# Patient Record
Sex: FEMALE | Race: WHITE | Employment: FULL TIME | URBAN - METROPOLITAN AREA
[De-identification: names, ages, dates, MRNs, and addresses within clinical notes are randomized per-mention and may not be internally consistent; named-entity substitution may affect disease eponyms.]

---

## 2017-01-03 ENCOUNTER — TELEPHONE (OUTPATIENT)
Dept: FAMILY MEDICINE CLINIC | Age: 47
End: 2017-01-03

## 2017-01-17 ENCOUNTER — OFFICE VISIT (OUTPATIENT)
Dept: FAMILY MEDICINE CLINIC | Age: 47
End: 2017-01-17

## 2017-01-17 VITALS
BODY MASS INDEX: 24.64 KG/M2 | TEMPERATURE: 97.9 F | DIASTOLIC BLOOD PRESSURE: 80 MMHG | HEIGHT: 67 IN | HEART RATE: 72 BPM | RESPIRATION RATE: 18 BRPM | WEIGHT: 157 LBS | SYSTOLIC BLOOD PRESSURE: 130 MMHG

## 2017-01-17 DIAGNOSIS — M79.7 FIBROMYALGIA: Primary | ICD-10-CM

## 2017-01-17 DIAGNOSIS — G89.4 CHRONIC PAIN SYNDROME: ICD-10-CM

## 2017-01-17 DIAGNOSIS — M25.50 ARTHRALGIA, UNSPECIFIED JOINT: ICD-10-CM

## 2017-01-17 DIAGNOSIS — R53.83 FATIGUE, UNSPECIFIED TYPE: ICD-10-CM

## 2017-01-17 DIAGNOSIS — M79.10 MYALGIA: ICD-10-CM

## 2017-01-17 DIAGNOSIS — R60.1 GENERALIZED EDEMA: ICD-10-CM

## 2017-01-17 PROCEDURE — 99214 OFFICE O/P EST MOD 30 MIN: CPT | Performed by: FAMILY MEDICINE

## 2017-01-17 RX ORDER — SULFASALAZINE 500 MG/1
500 TABLET ORAL 2 TIMES DAILY
COMMUNITY
End: 2017-04-10

## 2017-01-17 ASSESSMENT — PATIENT HEALTH QUESTIONNAIRE - PHQ9
2. FEELING DOWN, DEPRESSED OR HOPELESS: 0
1. LITTLE INTEREST OR PLEASURE IN DOING THINGS: 0
SUM OF ALL RESPONSES TO PHQ QUESTIONS 1-9: 0
SUM OF ALL RESPONSES TO PHQ9 QUESTIONS 1 & 2: 0

## 2017-01-20 ENCOUNTER — TELEPHONE (OUTPATIENT)
Dept: FAMILY MEDICINE CLINIC | Age: 47
End: 2017-01-20

## 2017-01-23 PROBLEM — M47.812 CERVICAL SPONDYLOSIS WITHOUT MYELOPATHY: Status: ACTIVE | Noted: 2017-01-23

## 2017-01-23 PROBLEM — M47.817 SPONDYLOSIS OF LUMBOSACRAL REGION WITHOUT MYELOPATHY OR RADICULOPATHY: Status: ACTIVE | Noted: 2017-01-23

## 2017-01-23 PROBLEM — G89.4 CHRONIC PAIN SYNDROME: Status: ACTIVE | Noted: 2017-01-23

## 2017-03-28 ENCOUNTER — OFFICE VISIT (OUTPATIENT)
Dept: FAMILY MEDICINE CLINIC | Age: 47
End: 2017-03-28

## 2017-03-28 VITALS
BODY MASS INDEX: 25.36 KG/M2 | SYSTOLIC BLOOD PRESSURE: 110 MMHG | DIASTOLIC BLOOD PRESSURE: 74 MMHG | RESPIRATION RATE: 16 BRPM | HEART RATE: 68 BPM | TEMPERATURE: 97.4 F | WEIGHT: 161.9 LBS

## 2017-03-28 DIAGNOSIS — E55.9 VITAMIN D DEFICIENCY: ICD-10-CM

## 2017-03-28 DIAGNOSIS — G89.4 CHRONIC PAIN SYNDROME: ICD-10-CM

## 2017-03-28 DIAGNOSIS — Z72.0 TOBACCO ABUSE: ICD-10-CM

## 2017-03-28 DIAGNOSIS — F41.8 MIXED ANXIETY DEPRESSIVE DISORDER: Primary | ICD-10-CM

## 2017-03-28 DIAGNOSIS — J45.20 MILD INTERMITTENT ASTHMA WITHOUT COMPLICATION: ICD-10-CM

## 2017-03-28 DIAGNOSIS — R60.1 GENERALIZED EDEMA: ICD-10-CM

## 2017-03-28 DIAGNOSIS — M47.812 CERVICAL SPONDYLOSIS WITHOUT MYELOPATHY: ICD-10-CM

## 2017-03-28 DIAGNOSIS — M79.7 FIBROMYALGIA: ICD-10-CM

## 2017-03-28 DIAGNOSIS — H90.5 HEARING LOSS, SENSORY, UNILATERAL: ICD-10-CM

## 2017-03-28 DIAGNOSIS — R53.83 FATIGUE, UNSPECIFIED TYPE: ICD-10-CM

## 2017-03-28 DIAGNOSIS — F41.8 MIXED ANXIETY DEPRESSIVE DISORDER: ICD-10-CM

## 2017-03-28 PROBLEM — J45.909 ASTHMA: Status: ACTIVE | Noted: 2017-03-28

## 2017-03-28 PROBLEM — J45.909 ASTHMA: Status: RESOLVED | Noted: 2017-03-28 | Resolved: 2017-03-28

## 2017-03-28 LAB
ALBUMIN SERPL-MCNC: 4.4 G/DL (ref 3.9–4.9)
ALP BLD-CCNC: 73 U/L (ref 40–130)
ALT SERPL-CCNC: 80 U/L (ref 0–33)
ANION GAP SERPL CALCULATED.3IONS-SCNC: 12 MEQ/L (ref 7–13)
AST SERPL-CCNC: 44 U/L (ref 0–35)
BILIRUB SERPL-MCNC: 0.4 MG/DL (ref 0–1.2)
BUN BLDV-MCNC: 8 MG/DL (ref 6–20)
CALCIUM SERPL-MCNC: 9.1 MG/DL (ref 8.6–10.2)
CHLORIDE BLD-SCNC: 100 MEQ/L (ref 98–107)
CHOLESTEROL, TOTAL: 201 MG/DL (ref 0–199)
CO2: 27 MEQ/L (ref 22–29)
CREAT SERPL-MCNC: 0.8 MG/DL (ref 0.5–0.9)
GFR AFRICAN AMERICAN: >60
GFR NON-AFRICAN AMERICAN: >60
GLOBULIN: 2.3 G/DL (ref 2.3–3.5)
GLUCOSE BLD-MCNC: 77 MG/DL (ref 74–109)
HDLC SERPL-MCNC: 49 MG/DL (ref 40–59)
LDL CHOLESTEROL CALCULATED: 129 MG/DL (ref 0–129)
POTASSIUM SERPL-SCNC: 4.8 MEQ/L (ref 3.5–5.1)
SODIUM BLD-SCNC: 139 MEQ/L (ref 132–144)
TOTAL PROTEIN: 6.7 G/DL (ref 6.4–8.1)
TRIGL SERPL-MCNC: 117 MG/DL (ref 0–200)
TSH SERPL DL<=0.05 MIU/L-ACNC: 2.46 UIU/ML (ref 0.27–4.2)
VITAMIN D 25-HYDROXY: 38.4 NG/ML (ref 30–100)

## 2017-03-28 PROCEDURE — 99214 OFFICE O/P EST MOD 30 MIN: CPT | Performed by: FAMILY MEDICINE

## 2017-03-28 RX ORDER — MIRTAZAPINE 15 MG/1
15 TABLET, FILM COATED ORAL NIGHTLY
Qty: 30 TABLET | Refills: 5 | Status: SHIPPED | OUTPATIENT
Start: 2017-03-28 | End: 2017-06-13 | Stop reason: SDUPTHER

## 2017-04-10 ENCOUNTER — OFFICE VISIT (OUTPATIENT)
Dept: FAMILY MEDICINE CLINIC | Age: 47
End: 2017-04-10

## 2017-04-10 VITALS
SYSTOLIC BLOOD PRESSURE: 120 MMHG | WEIGHT: 162.4 LBS | BODY MASS INDEX: 25.49 KG/M2 | HEART RATE: 106 BPM | HEIGHT: 67 IN | RESPIRATION RATE: 18 BRPM | TEMPERATURE: 98.3 F | DIASTOLIC BLOOD PRESSURE: 76 MMHG

## 2017-04-10 DIAGNOSIS — M79.7 FIBROMYALGIA: ICD-10-CM

## 2017-04-10 DIAGNOSIS — R53.83 FATIGUE, UNSPECIFIED TYPE: ICD-10-CM

## 2017-04-10 DIAGNOSIS — J45.20 MILD INTERMITTENT ASTHMA WITHOUT COMPLICATION: ICD-10-CM

## 2017-04-10 DIAGNOSIS — M79.10 MYALGIA: Primary | ICD-10-CM

## 2017-04-10 DIAGNOSIS — R60.1 GENERALIZED EDEMA: ICD-10-CM

## 2017-04-10 DIAGNOSIS — M25.50 ARTHRALGIA, UNSPECIFIED JOINT: ICD-10-CM

## 2017-04-10 DIAGNOSIS — Z72.0 TOBACCO ABUSE: ICD-10-CM

## 2017-04-10 DIAGNOSIS — R74.8 ELEVATED LIVER ENZYMES: ICD-10-CM

## 2017-04-10 PROCEDURE — 99214 OFFICE O/P EST MOD 30 MIN: CPT | Performed by: FAMILY MEDICINE

## 2017-04-10 RX ORDER — HYDROXYCHLOROQUINE SULFATE 200 MG/1
200 TABLET, FILM COATED ORAL DAILY
COMMUNITY
End: 2017-08-03

## 2017-04-13 ENCOUNTER — TELEPHONE (OUTPATIENT)
Dept: FAMILY MEDICINE CLINIC | Age: 47
End: 2017-04-13

## 2017-04-13 DIAGNOSIS — R51.9 CHRONIC NONINTRACTABLE HEADACHE, UNSPECIFIED HEADACHE TYPE: Primary | ICD-10-CM

## 2017-04-13 DIAGNOSIS — G89.29 CHRONIC NONINTRACTABLE HEADACHE, UNSPECIFIED HEADACHE TYPE: Primary | ICD-10-CM

## 2017-05-12 DIAGNOSIS — R74.8 ELEVATED LIVER ENZYMES: ICD-10-CM

## 2017-05-12 LAB
ALBUMIN SERPL-MCNC: 4 G/DL (ref 3.9–4.9)
ALP BLD-CCNC: 96 U/L (ref 40–130)
ALT SERPL-CCNC: 30 U/L (ref 0–33)
ANION GAP SERPL CALCULATED.3IONS-SCNC: 10 MEQ/L (ref 7–13)
AST SERPL-CCNC: 18 U/L (ref 0–35)
BILIRUB SERPL-MCNC: 0.2 MG/DL (ref 0–1.2)
BUN BLDV-MCNC: 9 MG/DL (ref 6–20)
CALCIUM SERPL-MCNC: 8.9 MG/DL (ref 8.6–10.2)
CHLORIDE BLD-SCNC: 100 MEQ/L (ref 98–107)
CO2: 29 MEQ/L (ref 22–29)
CREAT SERPL-MCNC: 0.78 MG/DL (ref 0.5–0.9)
GFR AFRICAN AMERICAN: >60
GFR NON-AFRICAN AMERICAN: >60
GLOBULIN: 2.6 G/DL (ref 2.3–3.5)
GLUCOSE BLD-MCNC: 106 MG/DL (ref 74–109)
POTASSIUM SERPL-SCNC: 4.8 MEQ/L (ref 3.5–5.1)
SODIUM BLD-SCNC: 139 MEQ/L (ref 132–144)
TOTAL PROTEIN: 6.6 G/DL (ref 6.4–8.1)

## 2017-06-13 ENCOUNTER — OFFICE VISIT (OUTPATIENT)
Dept: FAMILY MEDICINE CLINIC | Age: 47
End: 2017-06-13

## 2017-06-13 VITALS
TEMPERATURE: 99 F | HEART RATE: 74 BPM | RESPIRATION RATE: 16 BRPM | HEIGHT: 67 IN | BODY MASS INDEX: 25.55 KG/M2 | SYSTOLIC BLOOD PRESSURE: 118 MMHG | DIASTOLIC BLOOD PRESSURE: 76 MMHG | WEIGHT: 162.8 LBS

## 2017-06-13 DIAGNOSIS — B36.0 TINEA VERSICOLOR: ICD-10-CM

## 2017-06-13 DIAGNOSIS — F41.8 MIXED ANXIETY DEPRESSIVE DISORDER: Primary | ICD-10-CM

## 2017-06-13 DIAGNOSIS — R60.1 GENERALIZED EDEMA: ICD-10-CM

## 2017-06-13 DIAGNOSIS — E55.9 VITAMIN D DEFICIENCY: ICD-10-CM

## 2017-06-13 DIAGNOSIS — G89.4 CHRONIC PAIN SYNDROME: ICD-10-CM

## 2017-06-13 DIAGNOSIS — Z72.0 TOBACCO ABUSE: ICD-10-CM

## 2017-06-13 DIAGNOSIS — M79.7 FIBROMYALGIA: ICD-10-CM

## 2017-06-13 DIAGNOSIS — M47.817 SPONDYLOSIS OF LUMBOSACRAL REGION WITHOUT MYELOPATHY OR RADICULOPATHY: ICD-10-CM

## 2017-06-13 DIAGNOSIS — R53.83 FATIGUE, UNSPECIFIED TYPE: ICD-10-CM

## 2017-06-13 PROCEDURE — 99214 OFFICE O/P EST MOD 30 MIN: CPT | Performed by: FAMILY MEDICINE

## 2017-06-13 RX ORDER — KETOCONAZOLE 20 MG/ML
SHAMPOO TOPICAL
Qty: 120 ML | Refills: 3 | Status: SHIPPED | OUTPATIENT
Start: 2017-06-13 | End: 2018-03-01 | Stop reason: ALTCHOICE

## 2017-06-13 RX ORDER — PREDNISOLONE ACETATE 10 MG/ML
1 SUSPENSION/ DROPS OPHTHALMIC 4 TIMES DAILY
Refills: 2 | COMMUNITY
Start: 2017-05-11 | End: 2017-08-03

## 2017-06-13 RX ORDER — MIRTAZAPINE 30 MG/1
30 TABLET, FILM COATED ORAL NIGHTLY
Qty: 30 TABLET | Refills: 3 | Status: SHIPPED | OUTPATIENT
Start: 2017-06-13 | End: 2017-09-13 | Stop reason: SDUPTHER

## 2017-06-14 ENCOUNTER — TELEPHONE (OUTPATIENT)
Dept: FAMILY MEDICINE CLINIC | Age: 47
End: 2017-06-14

## 2017-06-26 ENCOUNTER — OFFICE VISIT (OUTPATIENT)
Dept: BEHAVIORAL/MENTAL HEALTH | Age: 47
End: 2017-06-26

## 2017-06-26 DIAGNOSIS — F43.23 ADJUSTMENT DISORDER WITH MIXED ANXIETY AND DEPRESSED MOOD: Primary | ICD-10-CM

## 2017-06-26 PROCEDURE — 90791 PSYCH DIAGNOSTIC EVALUATION: CPT | Performed by: PSYCHOLOGIST

## 2017-06-26 ASSESSMENT — PATIENT HEALTH QUESTIONNAIRE - PHQ9
4. FEELING TIRED OR HAVING LITTLE ENERGY: 3
8. MOVING OR SPEAKING SO SLOWLY THAT OTHER PEOPLE COULD HAVE NOTICED. OR THE OPPOSITE, BEING SO FIGETY OR RESTLESS THAT YOU HAVE BEEN MOVING AROUND A LOT MORE THAN USUAL: 3
6. FEELING BAD ABOUT YOURSELF - OR THAT YOU ARE A FAILURE OR HAVE LET YOURSELF OR YOUR FAMILY DOWN: 3
SUM OF ALL RESPONSES TO PHQ9 QUESTIONS 1 & 2: 6
7. TROUBLE CONCENTRATING ON THINGS, SUCH AS READING THE NEWSPAPER OR WATCHING TELEVISION: 3
10. IF YOU CHECKED OFF ANY PROBLEMS, HOW DIFFICULT HAVE THESE PROBLEMS MADE IT FOR YOU TO DO YOUR WORK, TAKE CARE OF THINGS AT HOME, OR GET ALONG WITH OTHER PEOPLE: 3
SUM OF ALL RESPONSES TO PHQ QUESTIONS 1-9: 22
5. POOR APPETITE OR OVEREATING: 3
2. FEELING DOWN, DEPRESSED OR HOPELESS: 3
9. THOUGHTS THAT YOU WOULD BE BETTER OFF DEAD, OR OF HURTING YOURSELF: 0
3. TROUBLE FALLING OR STAYING ASLEEP: 1
1. LITTLE INTEREST OR PLEASURE IN DOING THINGS: 3

## 2017-07-06 DIAGNOSIS — M54.2 NECK PAIN: Primary | ICD-10-CM

## 2017-07-10 ENCOUNTER — OFFICE VISIT (OUTPATIENT)
Dept: BEHAVIORAL/MENTAL HEALTH | Age: 47
End: 2017-07-10

## 2017-07-10 DIAGNOSIS — F43.23 ADJUSTMENT DISORDER WITH MIXED ANXIETY AND DEPRESSED MOOD: Primary | ICD-10-CM

## 2017-07-10 PROCEDURE — 90832 PSYTX W PT 30 MINUTES: CPT | Performed by: PSYCHOLOGIST

## 2017-07-10 ASSESSMENT — PATIENT HEALTH QUESTIONNAIRE - PHQ9
10. IF YOU CHECKED OFF ANY PROBLEMS, HOW DIFFICULT HAVE THESE PROBLEMS MADE IT FOR YOU TO DO YOUR WORK, TAKE CARE OF THINGS AT HOME, OR GET ALONG WITH OTHER PEOPLE: 3
7. TROUBLE CONCENTRATING ON THINGS, SUCH AS READING THE NEWSPAPER OR WATCHING TELEVISION: 2
SUM OF ALL RESPONSES TO PHQ9 QUESTIONS 1 & 2: 5
6. FEELING BAD ABOUT YOURSELF - OR THAT YOU ARE A FAILURE OR HAVE LET YOURSELF OR YOUR FAMILY DOWN: 3
4. FEELING TIRED OR HAVING LITTLE ENERGY: 2
2. FEELING DOWN, DEPRESSED OR HOPELESS: 3
5. POOR APPETITE OR OVEREATING: 3
1. LITTLE INTEREST OR PLEASURE IN DOING THINGS: 2
SUM OF ALL RESPONSES TO PHQ QUESTIONS 1-9: 19
3. TROUBLE FALLING OR STAYING ASLEEP: 2
8. MOVING OR SPEAKING SO SLOWLY THAT OTHER PEOPLE COULD HAVE NOTICED. OR THE OPPOSITE, BEING SO FIGETY OR RESTLESS THAT YOU HAVE BEEN MOVING AROUND A LOT MORE THAN USUAL: 2
9. THOUGHTS THAT YOU WOULD BE BETTER OFF DEAD, OR OF HURTING YOURSELF: 0

## 2017-07-24 ENCOUNTER — OFFICE VISIT (OUTPATIENT)
Dept: BEHAVIORAL/MENTAL HEALTH | Age: 47
End: 2017-07-24

## 2017-07-24 DIAGNOSIS — F43.23 ADJUSTMENT DISORDER WITH MIXED ANXIETY AND DEPRESSED MOOD: Primary | ICD-10-CM

## 2017-07-24 PROCEDURE — 90832 PSYTX W PT 30 MINUTES: CPT | Performed by: PSYCHOLOGIST

## 2017-07-24 ASSESSMENT — PATIENT HEALTH QUESTIONNAIRE - PHQ9
8. MOVING OR SPEAKING SO SLOWLY THAT OTHER PEOPLE COULD HAVE NOTICED. OR THE OPPOSITE, BEING SO FIGETY OR RESTLESS THAT YOU HAVE BEEN MOVING AROUND A LOT MORE THAN USUAL: 2
6. FEELING BAD ABOUT YOURSELF - OR THAT YOU ARE A FAILURE OR HAVE LET YOURSELF OR YOUR FAMILY DOWN: 0
5. POOR APPETITE OR OVEREATING: 3
9. THOUGHTS THAT YOU WOULD BE BETTER OFF DEAD, OR OF HURTING YOURSELF: 0
1. LITTLE INTEREST OR PLEASURE IN DOING THINGS: 2
10. IF YOU CHECKED OFF ANY PROBLEMS, HOW DIFFICULT HAVE THESE PROBLEMS MADE IT FOR YOU TO DO YOUR WORK, TAKE CARE OF THINGS AT HOME, OR GET ALONG WITH OTHER PEOPLE: 2
2. FEELING DOWN, DEPRESSED OR HOPELESS: 1
7. TROUBLE CONCENTRATING ON THINGS, SUCH AS READING THE NEWSPAPER OR WATCHING TELEVISION: 3
3. TROUBLE FALLING OR STAYING ASLEEP: 1
SUM OF ALL RESPONSES TO PHQ QUESTIONS 1-9: 15
SUM OF ALL RESPONSES TO PHQ9 QUESTIONS 1 & 2: 3
4. FEELING TIRED OR HAVING LITTLE ENERGY: 3

## 2017-08-03 ENCOUNTER — OFFICE VISIT (OUTPATIENT)
Dept: FAMILY MEDICINE CLINIC | Age: 47
End: 2017-08-03

## 2017-08-03 VITALS
BODY MASS INDEX: 25.43 KG/M2 | RESPIRATION RATE: 16 BRPM | TEMPERATURE: 97.6 F | HEIGHT: 67 IN | WEIGHT: 162 LBS | SYSTOLIC BLOOD PRESSURE: 118 MMHG | DIASTOLIC BLOOD PRESSURE: 74 MMHG | HEART RATE: 72 BPM

## 2017-08-03 DIAGNOSIS — M47.817 SPONDYLOSIS OF LUMBOSACRAL REGION WITHOUT MYELOPATHY OR RADICULOPATHY: ICD-10-CM

## 2017-08-03 DIAGNOSIS — M79.7 FIBROMYALGIA: Primary | ICD-10-CM

## 2017-08-03 DIAGNOSIS — M25.561 ARTHRALGIA OF RIGHT KNEE: ICD-10-CM

## 2017-08-03 DIAGNOSIS — L71.9 ROSACEA: ICD-10-CM

## 2017-08-03 DIAGNOSIS — M79.10 MYALGIA: ICD-10-CM

## 2017-08-03 DIAGNOSIS — R53.83 FATIGUE, UNSPECIFIED TYPE: ICD-10-CM

## 2017-08-03 DIAGNOSIS — M47.812 CERVICAL SPONDYLOSIS WITHOUT MYELOPATHY: ICD-10-CM

## 2017-08-03 PROCEDURE — 99214 OFFICE O/P EST MOD 30 MIN: CPT | Performed by: FAMILY MEDICINE

## 2017-08-03 RX ORDER — GABAPENTIN 600 MG/1
600 TABLET ORAL 3 TIMES DAILY
Qty: 180 TABLET | Refills: 3 | Status: SHIPPED | OUTPATIENT
Start: 2017-08-03 | End: 2017-11-17 | Stop reason: SDUPTHER

## 2017-08-03 RX ORDER — DEXTROAMPHETAMINE SACCHARATE, AMPHETAMINE ASPARTATE MONOHYDRATE, DEXTROAMPHETAMINE SULFATE AND AMPHETAMINE SULFATE 5; 5; 5; 5 MG/1; MG/1; MG/1; MG/1
1 CAPSULE, EXTENDED RELEASE ORAL DAILY
COMMUNITY
Start: 2017-07-27

## 2017-08-07 ENCOUNTER — OFFICE VISIT (OUTPATIENT)
Dept: BEHAVIORAL/MENTAL HEALTH | Age: 47
End: 2017-08-07

## 2017-08-07 DIAGNOSIS — F43.23 ADJUSTMENT DISORDER WITH MIXED ANXIETY AND DEPRESSED MOOD: Primary | ICD-10-CM

## 2017-08-07 PROCEDURE — 90832 PSYTX W PT 30 MINUTES: CPT | Performed by: PSYCHOLOGIST

## 2017-08-07 ASSESSMENT — PATIENT HEALTH QUESTIONNAIRE - PHQ9
9. THOUGHTS THAT YOU WOULD BE BETTER OFF DEAD, OR OF HURTING YOURSELF: 0
10. IF YOU CHECKED OFF ANY PROBLEMS, HOW DIFFICULT HAVE THESE PROBLEMS MADE IT FOR YOU TO DO YOUR WORK, TAKE CARE OF THINGS AT HOME, OR GET ALONG WITH OTHER PEOPLE: 0
8. MOVING OR SPEAKING SO SLOWLY THAT OTHER PEOPLE COULD HAVE NOTICED. OR THE OPPOSITE, BEING SO FIGETY OR RESTLESS THAT YOU HAVE BEEN MOVING AROUND A LOT MORE THAN USUAL: 0
3. TROUBLE FALLING OR STAYING ASLEEP: 0
6. FEELING BAD ABOUT YOURSELF - OR THAT YOU ARE A FAILURE OR HAVE LET YOURSELF OR YOUR FAMILY DOWN: 0
4. FEELING TIRED OR HAVING LITTLE ENERGY: 2
2. FEELING DOWN, DEPRESSED OR HOPELESS: 0
7. TROUBLE CONCENTRATING ON THINGS, SUCH AS READING THE NEWSPAPER OR WATCHING TELEVISION: 2
5. POOR APPETITE OR OVEREATING: 3
SUM OF ALL RESPONSES TO PHQ QUESTIONS 1-9: 8
SUM OF ALL RESPONSES TO PHQ9 QUESTIONS 1 & 2: 1
1. LITTLE INTEREST OR PLEASURE IN DOING THINGS: 1

## 2017-08-21 ENCOUNTER — OFFICE VISIT (OUTPATIENT)
Dept: BEHAVIORAL/MENTAL HEALTH | Age: 47
End: 2017-08-21

## 2017-08-21 DIAGNOSIS — F43.23 ADJUSTMENT DISORDER WITH MIXED ANXIETY AND DEPRESSED MOOD: Primary | ICD-10-CM

## 2017-08-21 PROCEDURE — 90832 PSYTX W PT 30 MINUTES: CPT | Performed by: PSYCHOLOGIST

## 2017-08-21 ASSESSMENT — PATIENT HEALTH QUESTIONNAIRE - PHQ9
SUM OF ALL RESPONSES TO PHQ9 QUESTIONS 1 & 2: 0
7. TROUBLE CONCENTRATING ON THINGS, SUCH AS READING THE NEWSPAPER OR WATCHING TELEVISION: 1
9. THOUGHTS THAT YOU WOULD BE BETTER OFF DEAD, OR OF HURTING YOURSELF: 0
1. LITTLE INTEREST OR PLEASURE IN DOING THINGS: 0
3. TROUBLE FALLING OR STAYING ASLEEP: 0
2. FEELING DOWN, DEPRESSED OR HOPELESS: 0
8. MOVING OR SPEAKING SO SLOWLY THAT OTHER PEOPLE COULD HAVE NOTICED. OR THE OPPOSITE, BEING SO FIGETY OR RESTLESS THAT YOU HAVE BEEN MOVING AROUND A LOT MORE THAN USUAL: 0
SUM OF ALL RESPONSES TO PHQ QUESTIONS 1-9: 4
6. FEELING BAD ABOUT YOURSELF - OR THAT YOU ARE A FAILURE OR HAVE LET YOURSELF OR YOUR FAMILY DOWN: 0
5. POOR APPETITE OR OVEREATING: 0
10. IF YOU CHECKED OFF ANY PROBLEMS, HOW DIFFICULT HAVE THESE PROBLEMS MADE IT FOR YOU TO DO YOUR WORK, TAKE CARE OF THINGS AT HOME, OR GET ALONG WITH OTHER PEOPLE: 1
4. FEELING TIRED OR HAVING LITTLE ENERGY: 3

## 2017-09-05 ENCOUNTER — OFFICE VISIT (OUTPATIENT)
Dept: BEHAVIORAL/MENTAL HEALTH | Age: 47
End: 2017-09-05

## 2017-09-05 DIAGNOSIS — F43.23 ADJUSTMENT DISORDER WITH MIXED ANXIETY AND DEPRESSED MOOD: Primary | ICD-10-CM

## 2017-09-05 PROCEDURE — 90832 PSYTX W PT 30 MINUTES: CPT | Performed by: PSYCHOLOGIST

## 2017-09-05 ASSESSMENT — PATIENT HEALTH QUESTIONNAIRE - PHQ9
10. IF YOU CHECKED OFF ANY PROBLEMS, HOW DIFFICULT HAVE THESE PROBLEMS MADE IT FOR YOU TO DO YOUR WORK, TAKE CARE OF THINGS AT HOME, OR GET ALONG WITH OTHER PEOPLE: 1
6. FEELING BAD ABOUT YOURSELF - OR THAT YOU ARE A FAILURE OR HAVE LET YOURSELF OR YOUR FAMILY DOWN: 0
5. POOR APPETITE OR OVEREATING: 0
8. MOVING OR SPEAKING SO SLOWLY THAT OTHER PEOPLE COULD HAVE NOTICED. OR THE OPPOSITE, BEING SO FIGETY OR RESTLESS THAT YOU HAVE BEEN MOVING AROUND A LOT MORE THAN USUAL: 0
3. TROUBLE FALLING OR STAYING ASLEEP: 2
SUM OF ALL RESPONSES TO PHQ9 QUESTIONS 1 & 2: 1
7. TROUBLE CONCENTRATING ON THINGS, SUCH AS READING THE NEWSPAPER OR WATCHING TELEVISION: 1
4. FEELING TIRED OR HAVING LITTLE ENERGY: 3
9. THOUGHTS THAT YOU WOULD BE BETTER OFF DEAD, OR OF HURTING YOURSELF: 0
SUM OF ALL RESPONSES TO PHQ QUESTIONS 1-9: 7
2. FEELING DOWN, DEPRESSED OR HOPELESS: 0
1. LITTLE INTEREST OR PLEASURE IN DOING THINGS: 1

## 2017-09-08 ENCOUNTER — TELEPHONE (OUTPATIENT)
Dept: FAMILY MEDICINE CLINIC | Age: 47
End: 2017-09-08

## 2017-09-11 ENCOUNTER — OFFICE VISIT (OUTPATIENT)
Dept: FAMILY MEDICINE CLINIC | Age: 47
End: 2017-09-11

## 2017-09-11 VITALS
HEIGHT: 67 IN | WEIGHT: 162.4 LBS | SYSTOLIC BLOOD PRESSURE: 136 MMHG | DIASTOLIC BLOOD PRESSURE: 92 MMHG | TEMPERATURE: 98.4 F | BODY MASS INDEX: 25.49 KG/M2 | HEART RATE: 124 BPM | RESPIRATION RATE: 20 BRPM

## 2017-09-11 DIAGNOSIS — R74.8 ELEVATED LIVER ENZYMES: ICD-10-CM

## 2017-09-11 DIAGNOSIS — M79.7 FIBROMYALGIA: ICD-10-CM

## 2017-09-11 DIAGNOSIS — R60.1 GENERALIZED EDEMA: ICD-10-CM

## 2017-09-11 DIAGNOSIS — J45.20 MILD INTERMITTENT ASTHMA WITHOUT COMPLICATION: ICD-10-CM

## 2017-09-11 DIAGNOSIS — F41.8 MIXED ANXIETY DEPRESSIVE DISORDER: ICD-10-CM

## 2017-09-11 DIAGNOSIS — G89.4 CHRONIC PAIN SYNDROME: ICD-10-CM

## 2017-09-11 DIAGNOSIS — E55.9 VITAMIN D DEFICIENCY: ICD-10-CM

## 2017-09-11 DIAGNOSIS — I10 ESSENTIAL HYPERTENSION: Primary | ICD-10-CM

## 2017-09-11 PROCEDURE — 99214 OFFICE O/P EST MOD 30 MIN: CPT | Performed by: FAMILY MEDICINE

## 2017-09-11 RX ORDER — CARVEDILOL 3.12 MG/1
3.12 TABLET ORAL 2 TIMES DAILY
Qty: 60 TABLET | Refills: 3 | Status: SHIPPED | OUTPATIENT
Start: 2017-09-11 | End: 2017-12-11 | Stop reason: SDUPTHER

## 2017-09-14 RX ORDER — MIRTAZAPINE 30 MG/1
TABLET, FILM COATED ORAL
Qty: 30 TABLET | Refills: 5 | Status: SHIPPED | OUTPATIENT
Start: 2017-09-14 | End: 2018-02-27 | Stop reason: SDUPTHER

## 2017-09-18 ENCOUNTER — OFFICE VISIT (OUTPATIENT)
Dept: BEHAVIORAL/MENTAL HEALTH | Age: 47
End: 2017-09-18

## 2017-09-18 DIAGNOSIS — F43.23 ADJUSTMENT DISORDER WITH MIXED ANXIETY AND DEPRESSED MOOD: Primary | ICD-10-CM

## 2017-09-18 PROCEDURE — 90832 PSYTX W PT 30 MINUTES: CPT | Performed by: PSYCHOLOGIST

## 2017-09-18 ASSESSMENT — PATIENT HEALTH QUESTIONNAIRE - PHQ9
SUM OF ALL RESPONSES TO PHQ9 QUESTIONS 1 & 2: 2
5. POOR APPETITE OR OVEREATING: 0
SUM OF ALL RESPONSES TO PHQ QUESTIONS 1-9: 5
4. FEELING TIRED OR HAVING LITTLE ENERGY: 1
9. THOUGHTS THAT YOU WOULD BE BETTER OFF DEAD, OR OF HURTING YOURSELF: 0
2. FEELING DOWN, DEPRESSED OR HOPELESS: 1
7. TROUBLE CONCENTRATING ON THINGS, SUCH AS READING THE NEWSPAPER OR WATCHING TELEVISION: 1
6. FEELING BAD ABOUT YOURSELF - OR THAT YOU ARE A FAILURE OR HAVE LET YOURSELF OR YOUR FAMILY DOWN: 1
3. TROUBLE FALLING OR STAYING ASLEEP: 0
10. IF YOU CHECKED OFF ANY PROBLEMS, HOW DIFFICULT HAVE THESE PROBLEMS MADE IT FOR YOU TO DO YOUR WORK, TAKE CARE OF THINGS AT HOME, OR GET ALONG WITH OTHER PEOPLE: 1
8. MOVING OR SPEAKING SO SLOWLY THAT OTHER PEOPLE COULD HAVE NOTICED. OR THE OPPOSITE, BEING SO FIGETY OR RESTLESS THAT YOU HAVE BEEN MOVING AROUND A LOT MORE THAN USUAL: 0
1. LITTLE INTEREST OR PLEASURE IN DOING THINGS: 1

## 2017-10-09 ENCOUNTER — OFFICE VISIT (OUTPATIENT)
Dept: BEHAVIORAL/MENTAL HEALTH | Age: 47
End: 2017-10-09

## 2017-10-09 DIAGNOSIS — F43.23 ADJUSTMENT DISORDER WITH MIXED ANXIETY AND DEPRESSED MOOD: Primary | ICD-10-CM

## 2017-10-09 PROCEDURE — 90832 PSYTX W PT 30 MINUTES: CPT | Performed by: PSYCHOLOGIST

## 2017-10-09 ASSESSMENT — PATIENT HEALTH QUESTIONNAIRE - PHQ9
1. LITTLE INTEREST OR PLEASURE IN DOING THINGS: 1
SUM OF ALL RESPONSES TO PHQ QUESTIONS 1-9: 7
2. FEELING DOWN, DEPRESSED OR HOPELESS: 0
4. FEELING TIRED OR HAVING LITTLE ENERGY: 2
8. MOVING OR SPEAKING SO SLOWLY THAT OTHER PEOPLE COULD HAVE NOTICED. OR THE OPPOSITE, BEING SO FIGETY OR RESTLESS THAT YOU HAVE BEEN MOVING AROUND A LOT MORE THAN USUAL: 1
SUM OF ALL RESPONSES TO PHQ9 QUESTIONS 1 & 2: 1
5. POOR APPETITE OR OVEREATING: 2
9. THOUGHTS THAT YOU WOULD BE BETTER OFF DEAD, OR OF HURTING YOURSELF: 0
6. FEELING BAD ABOUT YOURSELF - OR THAT YOU ARE A FAILURE OR HAVE LET YOURSELF OR YOUR FAMILY DOWN: 0
7. TROUBLE CONCENTRATING ON THINGS, SUCH AS READING THE NEWSPAPER OR WATCHING TELEVISION: 1
3. TROUBLE FALLING OR STAYING ASLEEP: 0
10. IF YOU CHECKED OFF ANY PROBLEMS, HOW DIFFICULT HAVE THESE PROBLEMS MADE IT FOR YOU TO DO YOUR WORK, TAKE CARE OF THINGS AT HOME, OR GET ALONG WITH OTHER PEOPLE: 1

## 2017-10-09 NOTE — PROGRESS NOTES
Behavioral Health Consultation  Dafne Copeland, Ph.D., [facility]  Psychologist  10/9/17  9:56 AM      Time spent with Patient: 30 minutes  This is patient's eighth  [facility] appointment. Reason for Consult:  depression, anxiety, stress and fatigue  Referring Provider: Tanja De Oliveira MD  [address] [facility] [facility], [address]      Feedback given to PCP. S:  Pt preorts limited progress toards goals describing herself as  \"Tired and annoyed\". Noted she had about 9 hrs sleep lastnight, woke up once but went right back to sleep. Notices she can be easily aggravated when tired- describing a low frustration tolerance. Tries to use humor, reaching out to social supports. Discussed the Two friends that she is very close to and \"I talk to them like every day\". Pt notes that one lives distant, and although the other is closer she has limited availability. Oldest daughter and grandson moved out and into new apartment. \"It's nice\". Processed the impact of this move on patient's functioning, living environment and responsibilities. Pt had a job interview in the same department but a different role and hopes to have an opportunity for a trial run. 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 If less physical, pt would have an opportunity to increase her hours at work. Pt has already had one day of training and hopes that in one week or two she may have something more specific. Anticipating her nephew's wedding this Friday. Noted her daughter can't come. Pt shared that her daughter is bisexual and this creates distress for her relationship with Pt's mom. Recently attended a Reiki seminar and enjoyed the techniques explored  In that practice. Pt denies SI/HI.            O:  MSE:    Appearance    alert, cooperative  Appetite abnormal  Sleep disturbance Yes  Fatigue Yes  Loss of pleasure No  Impulsive behavior No  Speech    spontaneous, normal rate and History     Social History    Marital status: Single     Spouse name: N/A    Number of children: N/A    Years of education: N/A     Occupational History    Not on file. Social History Main Topics    Smoking status: Current Every Day Smoker     Types: Cigarettes    Smokeless tobacco: Never Used    Alcohol use 1.2 oz/week     2 Glasses of wine per week    Drug use: No    Sexual activity: Yes     Partners: Male     Birth control/ protection: Surgical     Other Topics Concern    Not on file     Social History Narrative    No narrative on file       TOBACCO:   reports that she has been smoking Cigarettes. She has never used smokeless tobacco.  ETOH:   reports that she drinks about 1.2 oz of alcohol per week . Family History:   Family History   Problem Relation Age of Onset    Arthritis Mother     Depression Mother     High Blood Pressure Mother     High Cholesterol Mother     Depression Father     Depression Sister     Depression Brother     Learning Disabilities Brother          A:  Administered the PHQ9 which indicates a self report of mild symptom distress. Pt would benefit from continued [facility] services to increase coping skills to provide symptom management/control/relief.        PHQ Scores 10/9/2017 9/18/2017 9/5/2017 8/21/2017 8/7/2017 7/24/2017 7/10/2017   PHQ2 Score 1 2 1 0 1 3 5   PHQ9 Score 7 5 7 4 8 15 19     Interpretation of Total Score Depression Severity: 1-4 = Minimal depression, 5-9 = Mild depression, 10-14 = Moderate depression, 15-19 = Moderately severe depression, 20-27 = Severe depression      Diagnosis:    Adjustment disorder with mixed anxiety and depressed mood      Diagnosis Date    ADHD (attention deficit hyperactivity disorder)     Anxiety     Asthma     Chronic back pain     Depression     Fibromyalgia     Headache     Osteoarthritis        Plan:  Pt interventions:  Conducted functional assessment, [place]-setting to identify pt's primary goals for [facility] visit /

## 2017-10-09 NOTE — PATIENT INSTRUCTIONS
1. Remember the benefit of identifying the supports in your life, Types of support they offer and then tapping into the right type at that moment. 2. Continue to use humor for coping! It's a wonderful strategy. 3. Revisit that toolbox and tap into it as needed.   4. Return in 3-4 weeks

## 2017-10-13 ENCOUNTER — TELEPHONE (OUTPATIENT)
Dept: FAMILY MEDICINE CLINIC | Age: 47
End: 2017-10-13

## 2017-10-13 DIAGNOSIS — Z46.1 HEARING AID FITTING OR ADJUSTMENT: Primary | ICD-10-CM

## 2017-10-17 ENCOUNTER — OFFICE VISIT (OUTPATIENT)
Dept: FAMILY MEDICINE CLINIC | Age: 47
End: 2017-10-17

## 2017-10-17 VITALS
SYSTOLIC BLOOD PRESSURE: 130 MMHG | HEART RATE: 110 BPM | OXYGEN SATURATION: 97 % | TEMPERATURE: 97.9 F | RESPIRATION RATE: 16 BRPM | BODY MASS INDEX: 25.74 KG/M2 | DIASTOLIC BLOOD PRESSURE: 86 MMHG | HEIGHT: 67 IN | WEIGHT: 164 LBS

## 2017-10-17 DIAGNOSIS — G89.4 CHRONIC PAIN SYNDROME: Primary | ICD-10-CM

## 2017-10-17 DIAGNOSIS — Z23 NEED FOR VACCINATION: ICD-10-CM

## 2017-10-17 DIAGNOSIS — R60.1 GENERALIZED EDEMA: ICD-10-CM

## 2017-10-17 DIAGNOSIS — R53.83 FATIGUE, UNSPECIFIED TYPE: ICD-10-CM

## 2017-10-17 DIAGNOSIS — M25.561 ARTHRALGIA OF RIGHT KNEE: ICD-10-CM

## 2017-10-17 DIAGNOSIS — M79.10 MYALGIA: ICD-10-CM

## 2017-10-17 DIAGNOSIS — M79.7 FIBROMYALGIA: ICD-10-CM

## 2017-10-17 DIAGNOSIS — M47.817 SPONDYLOSIS OF LUMBOSACRAL REGION WITHOUT MYELOPATHY OR RADICULOPATHY: ICD-10-CM

## 2017-10-17 DIAGNOSIS — M47.812 CERVICAL SPONDYLOSIS WITHOUT MYELOPATHY: ICD-10-CM

## 2017-10-17 PROCEDURE — G8427 DOCREV CUR MEDS BY ELIG CLIN: HCPCS | Performed by: FAMILY MEDICINE

## 2017-10-17 PROCEDURE — 99214 OFFICE O/P EST MOD 30 MIN: CPT | Performed by: FAMILY MEDICINE

## 2017-10-17 PROCEDURE — 4004F PT TOBACCO SCREEN RCVD TLK: CPT | Performed by: FAMILY MEDICINE

## 2017-10-17 PROCEDURE — G8417 CALC BMI ABV UP PARAM F/U: HCPCS | Performed by: FAMILY MEDICINE

## 2017-10-17 PROCEDURE — G8484 FLU IMMUNIZE NO ADMIN: HCPCS | Performed by: FAMILY MEDICINE

## 2017-10-17 PROCEDURE — 90688 IIV4 VACCINE SPLT 0.5 ML IM: CPT | Performed by: FAMILY MEDICINE

## 2017-10-17 PROCEDURE — 90471 IMMUNIZATION ADMIN: CPT | Performed by: FAMILY MEDICINE

## 2017-10-17 RX ORDER — BUSPIRONE HYDROCHLORIDE 7.5 MG/1
7.5 TABLET ORAL 2 TIMES DAILY
Qty: 60 TABLET | Refills: 3 | Status: SHIPPED | OUTPATIENT
Start: 2017-10-17 | End: 2018-03-01 | Stop reason: SDUPTHER

## 2017-10-17 NOTE — PROGRESS NOTES
SHOULDER SURGERY Right 2004    TUBAL LIGATION         [unfilled]    family history includes Arthritis in her mother; Depression in her brother, father, mother, and sister; High Blood Pressure in her mother; High Cholesterol in her mother; Learning Disabilities in her brother. Social History     Social History    Marital status: Single     Spouse name: N/A    Number of children: N/A    Years of education: N/A     Occupational History    Not on file. Social History Main Topics    Smoking status: Current Every Day Smoker     Types: Cigarettes    Smokeless tobacco: Never Used    Alcohol use 1.2 oz/week     2 Glasses of wine per week    Drug use: No    Sexual activity: Yes     Partners: Male     Birth control/ protection: Surgical     Other Topics Concern    Not on file     Social History Narrative    No narrative on file       Allergies   Allergen Reactions    Aleve [Naproxen] Other (See Comments)     Took to much     Naproxen Sodium Other (See Comments)     Insomnia    Onion Swelling     Throat swelling     Penicillins Hives    Prednisone Other (See Comments)     Vision problems per patient        Review of Systems - General ROS: negative  Psychological ROS: negative  ENT ROS: negative  Hematological and Lymphatic ROS: negative  Endocrine ROS: negative  Respiratory ROS: no cough, shortness of breath, or wheezing  Cardiovascular ROS: no chest pain or dyspnea on exertion  Gastrointestinal ROS: no abdominal pain, change in bowel habits, or black or bloody stools  Genito-Urinary ROS: no dysuria, trouble voiding, or hematuria  Musculoskeletal ROS: pain  Neurological ROS: no TIA or stroke symptoms  Dermatological ROS: Negative    Blood pressure 130/86, pulse 110, temperature 97.9 °F (36.6 °C), temperature source Temporal, resp. rate 16, height 5' 7\" (1.702 m), weight 164 lb (74.4 kg), SpO2 97 %, not currently breastfeeding.     Physical Examination: General appearance - alert, well appearing, and in no mouth four times a week      gabapentin (NEURONTIN) 600 MG tablet Take 1 tablet by mouth 3 times daily 180 tablet 3    etodolac (LODINE) 400 MG tablet Take 1 tablet by mouth 2 times daily 60 tablet 3    PROAIR  (90 BASE) MCG/ACT inhaler Inhale 2 puffs into the lungs every 6 hours as needed for Wheezing 3 Inhaler 3    brimonidine-timolol (COMBIGAN) 0.2-0.5 % ophthalmic solution Use in left eye      [DISCONTINUED] gabapentin (NEURONTIN) 300 MG capsule TAKE 1 CAP IN AM, 1 CAP AT 2 PM AND 2 CAPS AT NIGHT 120 capsule 2    magnesium oxide (MAG-OX) 400 (240 Mg) MG tablet Take 1 tablet by mouth daily 30 tablet 0    ketoconazole (NIZORAL) 2 % shampoo Apply topically daily as needed. 120 mL 3    [DISCONTINUED] milnacipran HCl (SAVELLA) 100 MG TABS Take 1 tablet by mouth 2 times daily 60 tablet 3    SUMAtriptan (IMITREX) 50 MG tablet Take 1 tablet by mouth once as needed for Migraine 9 tablet 3    [DISCONTINUED] busPIRone (BUSPAR) 7.5 MG tablet Take 1 tablet by mouth 2 times daily 60 tablet 3     No facility-administered encounter medications on file as of 10/17/2017. Report any suicidal/homocidal ideation, any psychotic or manic symptoms. Recommend lifestyle modification. Monitor liver blood work at next visit  Keep specialist evaluation  Return in about 3 months (around 1/17/2018). Patient education provided. They understand and agree with this course of treatment. They will return with new or worsening symptoms. Patient instructed to remain current with appropriate annual health maintenance.

## 2017-10-31 ENCOUNTER — OFFICE VISIT (OUTPATIENT)
Dept: FAMILY MEDICINE CLINIC | Age: 47
End: 2017-10-31

## 2017-10-31 VITALS
TEMPERATURE: 97.9 F | DIASTOLIC BLOOD PRESSURE: 84 MMHG | HEIGHT: 67 IN | RESPIRATION RATE: 14 BRPM | BODY MASS INDEX: 25.58 KG/M2 | WEIGHT: 163 LBS | HEART RATE: 125 BPM | OXYGEN SATURATION: 96 % | SYSTOLIC BLOOD PRESSURE: 130 MMHG

## 2017-10-31 DIAGNOSIS — R53.83 FATIGUE, UNSPECIFIED TYPE: Primary | ICD-10-CM

## 2017-10-31 DIAGNOSIS — M47.817 SPONDYLOSIS OF LUMBOSACRAL REGION WITHOUT MYELOPATHY OR RADICULOPATHY: ICD-10-CM

## 2017-10-31 DIAGNOSIS — M79.7 FIBROMYALGIA: ICD-10-CM

## 2017-10-31 DIAGNOSIS — G89.4 CHRONIC PAIN SYNDROME: ICD-10-CM

## 2017-10-31 DIAGNOSIS — M47.812 CERVICAL SPONDYLOSIS WITHOUT MYELOPATHY: ICD-10-CM

## 2017-10-31 DIAGNOSIS — M79.10 MYALGIA: ICD-10-CM

## 2017-10-31 DIAGNOSIS — R60.1 GENERALIZED EDEMA: ICD-10-CM

## 2017-10-31 DIAGNOSIS — M25.561 ARTHRALGIA OF RIGHT KNEE: ICD-10-CM

## 2017-10-31 DIAGNOSIS — R53.83 FATIGUE, UNSPECIFIED TYPE: ICD-10-CM

## 2017-10-31 LAB
ALBUMIN SERPL-MCNC: 4.6 G/DL (ref 3.9–4.9)
ALP BLD-CCNC: 78 U/L (ref 40–130)
ALT SERPL-CCNC: 36 U/L (ref 0–33)
ANION GAP SERPL CALCULATED.3IONS-SCNC: 18 MEQ/L (ref 7–13)
AST SERPL-CCNC: 24 U/L (ref 0–35)
BASOPHILS ABSOLUTE: 0.1 K/UL (ref 0–0.2)
BASOPHILS RELATIVE PERCENT: 1.1 %
BILIRUB SERPL-MCNC: 0.3 MG/DL (ref 0–1.2)
BUN BLDV-MCNC: 10 MG/DL (ref 6–20)
CALCIUM SERPL-MCNC: 9.4 MG/DL (ref 8.6–10.2)
CHLORIDE BLD-SCNC: 98 MEQ/L (ref 98–107)
CO2: 25 MEQ/L (ref 22–29)
CREAT SERPL-MCNC: 0.83 MG/DL (ref 0.5–0.9)
EOSINOPHILS ABSOLUTE: 0.6 K/UL (ref 0–0.7)
EOSINOPHILS RELATIVE PERCENT: 6.6 %
FOLATE: 13.8 NG/ML (ref 7.3–26.1)
GFR AFRICAN AMERICAN: >60
GFR NON-AFRICAN AMERICAN: >60
GLOBULIN: 2.8 G/DL (ref 2.3–3.5)
GLUCOSE BLD-MCNC: 123 MG/DL (ref 74–109)
HCT VFR BLD CALC: 46.5 % (ref 37–47)
HEMOGLOBIN: 15.4 G/DL (ref 12–16)
LYMPHOCYTES ABSOLUTE: 3 K/UL (ref 1–4.8)
LYMPHOCYTES RELATIVE PERCENT: 32.7 %
MCH RBC QN AUTO: 31.7 PG (ref 27–31.3)
MCHC RBC AUTO-ENTMCNC: 33.1 % (ref 33–37)
MCV RBC AUTO: 95.6 FL (ref 82–100)
MONOCYTES ABSOLUTE: 0.6 K/UL (ref 0.2–0.8)
MONOCYTES RELATIVE PERCENT: 7 %
NEUTROPHILS ABSOLUTE: 4.9 K/UL (ref 1.4–6.5)
NEUTROPHILS RELATIVE PERCENT: 52.6 %
PDW BLD-RTO: 15.9 % (ref 11.5–14.5)
PLATELET # BLD: 422 K/UL (ref 130–400)
POTASSIUM SERPL-SCNC: 5.2 MEQ/L (ref 3.5–5.1)
RBC # BLD: 4.86 M/UL (ref 4.2–5.4)
SODIUM BLD-SCNC: 141 MEQ/L (ref 132–144)
TOTAL PROTEIN: 7.4 G/DL (ref 6.4–8.1)
TSH REFLEX: 2.28 UIU/ML (ref 0.27–4.2)
VITAMIN B-12: 729 PG/ML (ref 211–946)
VITAMIN D 25-HYDROXY: 51.5 NG/ML (ref 30–100)
WBC # BLD: 9.2 K/UL (ref 4.8–10.8)

## 2017-10-31 PROCEDURE — 4004F PT TOBACCO SCREEN RCVD TLK: CPT | Performed by: FAMILY MEDICINE

## 2017-10-31 PROCEDURE — 99214 OFFICE O/P EST MOD 30 MIN: CPT | Performed by: FAMILY MEDICINE

## 2017-10-31 PROCEDURE — G8484 FLU IMMUNIZE NO ADMIN: HCPCS | Performed by: FAMILY MEDICINE

## 2017-10-31 PROCEDURE — G8417 CALC BMI ABV UP PARAM F/U: HCPCS | Performed by: FAMILY MEDICINE

## 2017-10-31 PROCEDURE — G8427 DOCREV CUR MEDS BY ELIG CLIN: HCPCS | Performed by: FAMILY MEDICINE

## 2017-10-31 NOTE — PROGRESS NOTES
Chief Complaint   Patient presents with    Chest Pain    Fatigue    Swelling       Edema has been ongoing  She would like blood work  Recommend low sodium diet  Weight is stable  May be manifestation of fibromyalgia  She declines more testing    fatigue symptoms are worsening  She would like additional blood work  Discussed difficulty with work    Myalgia relatively stable  Fibro sx  Status post pain dr kirkland  saw rheum as well    Vit d deficiency stable  rec supplement  Recommend blood work    Mood no real change  No new issues reported  No suicidal/homocidal ideation. No psychotic or manic symptoms. Patient presents for exam.      Patient Active Problem List   Diagnosis    Myalgia    Arthralgia    Fatigue    Generalized edema    Fibromyalgia    Cervical spondylosis without myelopathy    Spondylosis of lumbosacral region without myelopathy or radiculopathy    Chronic pain syndrome    Contusion of foot    History of  section    History of laparoscopy    Mixed conductive and sensorineural hearing loss, unilateral with unrestricted hearing on the contralateral side    Hearing loss, sensory, unilateral    Delivery normal    Vitamin D deficiency    Mixed anxiety depressive disorder    Mild intermittent asthma without complication    Tobacco abuse    Elevated liver enzymes       has a current medication list which includes the following prescription(s): buspirone, milnacipran hcl, mirtazapine, carvedilol, amphetamine-dextroamphetamine, methotrexate, gabapentin, ketoconazole, etodolac, proair hfa, brimonidine-timolol, magnesium oxide, and sumatriptan. Past Medical History:   Diagnosis Date    ADHD (attention deficit hyperactivity disorder)     Anxiety     Asthma     Chronic back pain     Depression     Fibromyalgia     Headache     Osteoarthritis        Past Surgical History:   Procedure Laterality Date     SECTION      EYE SURGERY Right 2017    DR. Babita Mo KNEE ARTHROSCOPY Right 2006    SHOULDER SURGERY Right 2004    TUBAL LIGATION         [unfilled]    family history includes Arthritis in her mother; Depression in her brother, father, mother, and sister; High Blood Pressure in her mother; High Cholesterol in her mother; Learning Disabilities in her brother. Social History     Social History    Marital status: Single     Spouse name: N/A    Number of children: N/A    Years of education: N/A     Occupational History    Not on file. Social History Main Topics    Smoking status: Current Every Day Smoker     Types: Cigarettes    Smokeless tobacco: Never Used    Alcohol use 1.2 oz/week     2 Glasses of wine per week    Drug use: No    Sexual activity: Yes     Partners: Male     Birth control/ protection: Surgical     Other Topics Concern    Not on file     Social History Narrative    No narrative on file       Allergies   Allergen Reactions    Aleve [Naproxen] Other (See Comments)     Took to much     Naproxen Sodium Other (See Comments)     Insomnia    Onion Swelling     Throat swelling     Penicillins Hives    Prednisone Other (See Comments)     Vision problems per patient        Review of Systems - General ROS: negative  Psychological ROS: negative  ENT ROS: negative  Hematological and Lymphatic ROS: negative  Endocrine ROS: negative  Respiratory ROS: no cough, shortness of breath, or wheezing  Cardiovascular ROS: no chest pain or dyspnea on exertion  Gastrointestinal ROS: no abdominal pain, change in bowel habits, or black or bloody stools  Genito-Urinary ROS: no dysuria, trouble voiding, or hematuria  Musculoskeletal ROS: pain  Neurological ROS: no TIA or stroke symptoms  Dermatological ROS: Negative    Blood pressure 130/84, pulse 125, temperature 97.9 °F (36.6 °C), temperature source Temporal, resp. rate 14, height 5' 7\" (1.702 m), weight 163 lb (73.9 kg), SpO2 96 %, not currently breastfeeding.     Physical Examination: General appearance - Standing Status:   Future     Number of Occurrences:   1     Standing Expiration Date:   10/31/2018    Vitamin B12 & Folate     Standing Status:   Future     Number of Occurrences:   1     Standing Expiration Date:   10/31/2018       Outpatient Encounter Prescriptions as of 10/31/2017   Medication Sig Dispense Refill    busPIRone (BUSPAR) 7.5 MG tablet Take 1 tablet by mouth 2 times daily 60 tablet 3    milnacipran HCl (SAVELLA) 100 MG TABS Take 1 tablet by mouth 2 times daily 60 tablet 3    mirtazapine (REMERON) 30 MG tablet TAKE 1 TABLET BY MOUTH NIGHTLY 30 tablet 5    carvedilol (COREG) 3.125 MG tablet Take 1 tablet by mouth 2 times daily 60 tablet 3    amphetamine-dextroamphetamine (ADDERALL XR) 20 MG extended release capsule Take 1 capsule by mouth daily  .  methotrexate (RHEUMATREX) 2.5 MG chemo tablet Take 1 tablet by mouth four times a week      gabapentin (NEURONTIN) 600 MG tablet Take 1 tablet by mouth 3 times daily 180 tablet 3    ketoconazole (NIZORAL) 2 % shampoo Apply topically daily as needed. 120 mL 3    etodolac (LODINE) 400 MG tablet Take 1 tablet by mouth 2 times daily 60 tablet 3    PROAIR  (90 BASE) MCG/ACT inhaler Inhale 2 puffs into the lungs every 6 hours as needed for Wheezing 3 Inhaler 3    brimonidine-timolol (COMBIGAN) 0.2-0.5 % ophthalmic solution Use in left eye      magnesium oxide (MAG-OX) 400 (240 Mg) MG tablet Take 1 tablet by mouth daily 30 tablet 0    SUMAtriptan (IMITREX) 50 MG tablet Take 1 tablet by mouth once as needed for Migraine 9 tablet 3     No facility-administered encounter medications on file as of 10/31/2017. Report any suicidal/homocidal ideation, any psychotic or manic symptoms. Recommend lifestyle modification. Monitor liver blood work at next visit  Keep specialist evaluation  Return in about 3 months (around 1/31/2018). Patient education provided. They understand and agree with this course of treatment.  They will return with new

## 2017-10-31 NOTE — PATIENT INSTRUCTIONS
attend a smoking cessation program to help you quit smoking. When you choose a program, look for one that has proven success. Ask your doctor for ideas. You will greatly increase your chances of success if you take medicine as well as get counseling or join a cessation program.  Some of the changes you feel when you first quit tobacco are uncomfortable. Your body will miss the nicotine at first, and you may feel short-tempered and grumpy. You may have trouble sleeping or concentrating. Medicine can help you deal with these symptoms. You may struggle with changing your smoking habits and rituals. The last step is the tricky one: Be prepared for the smoking urge to continue for a time. This is a lot to deal with, but keep at it. You will feel better. Follow-up care is a key part of your treatment and safety. Be sure to make and go to all appointments, and call your doctor if you are having problems. Its also a good idea to know your test results and keep a list of the medicines you take. How can you care for yourself at home? · Ask your family, friends, and coworkers for support. You have a better chance of quitting if you have help and support. · Join a support group, such as Nicotine Anonymous, for people who are trying to quit smoking. · Consider signing up for a smoking cessation program, such as the American Lung Association's Freedom from Smoking program.  · Set a quit date. Pick your date carefully so that it is not right in the middle of a big deadline or stressful time. Once you quit, do not even take a puff. Get rid of all ashtrays and lighters after your last cigarette. Clean your house and your clothes so that they do not smell of smoke. · Learn how to be a nonsmoker. Think about ways you can avoid those things that make you reach for a cigarette. ¨ Avoid situations that put you at greatest risk for smoking. For some people, it is hard to have a drink with friends without smoking.  For others, they might skip a coffee break with coworkers who smoke. ¨ Change your daily routine. Take a different route to work or eat a meal in a different place. · Cut down on stress. Calm yourself or release tension by doing an activity you enjoy, such as reading a book, taking a hot bath, or gardening. · Talk to your doctor or pharmacist about nicotine replacement therapy, which replaces the nicotine in your body. You still get nicotine but you do not use tobacco. Nicotine replacement products help you slowly reduce the amount of nicotine you need. These products come in several forms, many of them available over-the-counter:  ¨ Nicotine patches  ¨ Nicotine gum and lozenges  ¨ Nicotine inhaler  · Ask your doctor about bupropion (Wellbutrin) or varenicline (Chantix), which are prescription medicines. They do not contain nicotine. They help you by reducing withdrawal symptoms, such as stress and anxiety. · Some people find hypnosis, acupuncture, and massage helpful for ending the smoking habit. · Eat a healthy diet and get regular exercise. Having healthy habits will help your body move past its craving for nicotine. · Be prepared to keep trying. Most people are not successful the first few times they try to quit. Do not get mad at yourself if you smoke again. Make a list of things you learned and think about when you want to try again, such as next week, next month, or next year. Where can you learn more? Go to https://[email or web address]. org and sign in to your [employer] account. Enter O131 in the [facility] box to learn more about \"Stopping Smoking: Care Instructions. \"     If you do not have an account, please click on the \"Sign Up Now\" link. Current as of: March 20, 2017  Content Version: 11.3  © 5444-5133 [employer]. Care instructions adapted under license by [facility] ([facility]).  If you have questions about a medical condition or this instruction, always ask your healthcare professional.

## 2017-11-06 ENCOUNTER — OFFICE VISIT (OUTPATIENT)
Dept: BEHAVIORAL/MENTAL HEALTH | Age: 47
End: 2017-11-06

## 2017-11-06 DIAGNOSIS — F43.23 ADJUSTMENT DISORDER WITH MIXED ANXIETY AND DEPRESSED MOOD: Primary | ICD-10-CM

## 2017-11-06 PROCEDURE — 90832 PSYTX W PT 30 MINUTES: CPT | Performed by: PSYCHOLOGIST

## 2017-11-06 ASSESSMENT — PATIENT HEALTH QUESTIONNAIRE - PHQ9
SUM OF ALL RESPONSES TO PHQ QUESTIONS 1-9: 0
9. THOUGHTS THAT YOU WOULD BE BETTER OFF DEAD, OR OF HURTING YOURSELF: 0
3. TROUBLE FALLING OR STAYING ASLEEP: 0
6. FEELING BAD ABOUT YOURSELF - OR THAT YOU ARE A FAILURE OR HAVE LET YOURSELF OR YOUR FAMILY DOWN: 0
10. IF YOU CHECKED OFF ANY PROBLEMS, HOW DIFFICULT HAVE THESE PROBLEMS MADE IT FOR YOU TO DO YOUR WORK, TAKE CARE OF THINGS AT HOME, OR GET ALONG WITH OTHER PEOPLE: 0
1. LITTLE INTEREST OR PLEASURE IN DOING THINGS: 0
4. FEELING TIRED OR HAVING LITTLE ENERGY: 0
2. FEELING DOWN, DEPRESSED OR HOPELESS: 0
7. TROUBLE CONCENTRATING ON THINGS, SUCH AS READING THE NEWSPAPER OR WATCHING TELEVISION: 0
5. POOR APPETITE OR OVEREATING: 0
8. MOVING OR SPEAKING SO SLOWLY THAT OTHER PEOPLE COULD HAVE NOTICED. OR THE OPPOSITE, BEING SO FIGETY OR RESTLESS THAT YOU HAVE BEEN MOVING AROUND A LOT MORE THAN USUAL: 0
SUM OF ALL RESPONSES TO PHQ9 QUESTIONS 1 & 2: 0

## 2017-11-06 NOTE — PROGRESS NOTES
daily 60 tablet 3    mirtazapine (REMERON) 30 MG tablet TAKE 1 TABLET BY MOUTH NIGHTLY 30 tablet 5    carvedilol (COREG) 3.125 MG tablet Take 1 tablet by mouth 2 times daily 60 tablet 3    amphetamine-dextroamphetamine (ADDERALL XR) 20 MG extended release capsule Take 1 capsule by mouth daily  .  methotrexate (RHEUMATREX) 2.5 MG chemo tablet Take 1 tablet by mouth four times a week      gabapentin (NEURONTIN) 600 MG tablet Take 1 tablet by mouth 3 times daily 180 tablet 3    magnesium oxide (MAG-OX) 400 (240 Mg) MG tablet Take 1 tablet by mouth daily 30 tablet 0    ketoconazole (NIZORAL) 2 % shampoo Apply topically daily as needed. 120 mL 3    etodolac (LODINE) 400 MG tablet Take 1 tablet by mouth 2 times daily 60 tablet 3    PROAIR  (90 BASE) MCG/ACT inhaler Inhale 2 puffs into the lungs every 6 hours as needed for Wheezing 3 Inhaler 3    SUMAtriptan (IMITREX) 50 MG tablet Take 1 tablet by mouth once as needed for Migraine 9 tablet 3    brimonidine-timolol (COMBIGAN) 0.2-0.5 % ophthalmic solution Use in left eye       No current facility-administered medications for this visit. Social History:   Social History     Social History    Marital status: Single     Spouse name: N/A    Number of children: N/A    Years of education: N/A     Occupational History    Not on file. Social History Main Topics    Smoking status: Current Every Day Smoker     Types: Cigarettes    Smokeless tobacco: Never Used    Alcohol use 1.2 oz/week     2 Glasses of wine per week    Drug use: No    Sexual activity: Yes     Partners: Male     Birth control/ protection: Surgical     Other Topics Concern    Not on file     Social History Narrative    No narrative on file       TOBACCO:   reports that she has been smoking Cigarettes. She has never used smokeless tobacco.  ETOH:   reports that she drinks about 1.2 oz of alcohol per week .     Family History:   Family History   Problem Relation Age of Onset

## 2017-11-06 NOTE — PATIENT INSTRUCTIONS
1. Consider healthy snacking into your routine. 2. Consider what you need to hear in moments of panic- develop and apply a personal mantra for thought stopping.    3. Return in about a month

## 2017-11-16 ENCOUNTER — OFFICE VISIT (OUTPATIENT)
Dept: FAMILY MEDICINE CLINIC | Age: 47
End: 2017-11-16

## 2017-11-16 VITALS
TEMPERATURE: 98.6 F | DIASTOLIC BLOOD PRESSURE: 80 MMHG | SYSTOLIC BLOOD PRESSURE: 130 MMHG | RESPIRATION RATE: 16 BRPM | WEIGHT: 164 LBS | HEIGHT: 67 IN | BODY MASS INDEX: 25.74 KG/M2 | HEART RATE: 126 BPM | OXYGEN SATURATION: 97 %

## 2017-11-16 DIAGNOSIS — G89.4 CHRONIC PAIN SYNDROME: ICD-10-CM

## 2017-11-16 DIAGNOSIS — M47.812 CERVICAL SPONDYLOSIS WITHOUT MYELOPATHY: ICD-10-CM

## 2017-11-16 DIAGNOSIS — R51.9 NONINTRACTABLE HEADACHE, UNSPECIFIED CHRONICITY PATTERN, UNSPECIFIED HEADACHE TYPE: Primary | ICD-10-CM

## 2017-11-16 DIAGNOSIS — M79.7 FIBROMYALGIA: ICD-10-CM

## 2017-11-16 DIAGNOSIS — M47.817 SPONDYLOSIS OF LUMBOSACRAL REGION WITHOUT MYELOPATHY OR RADICULOPATHY: ICD-10-CM

## 2017-11-16 DIAGNOSIS — R60.1 GENERALIZED EDEMA: ICD-10-CM

## 2017-11-16 DIAGNOSIS — M25.561 ARTHRALGIA OF RIGHT KNEE: ICD-10-CM

## 2017-11-16 DIAGNOSIS — R53.83 FATIGUE, UNSPECIFIED TYPE: ICD-10-CM

## 2017-11-16 DIAGNOSIS — M79.10 MYALGIA: ICD-10-CM

## 2017-11-16 PROCEDURE — 99214 OFFICE O/P EST MOD 30 MIN: CPT | Performed by: FAMILY MEDICINE

## 2017-11-16 PROCEDURE — G8427 DOCREV CUR MEDS BY ELIG CLIN: HCPCS | Performed by: FAMILY MEDICINE

## 2017-11-16 PROCEDURE — 96372 THER/PROPH/DIAG INJ SC/IM: CPT | Performed by: FAMILY MEDICINE

## 2017-11-16 PROCEDURE — G8484 FLU IMMUNIZE NO ADMIN: HCPCS | Performed by: FAMILY MEDICINE

## 2017-11-16 PROCEDURE — 4004F PT TOBACCO SCREEN RCVD TLK: CPT | Performed by: FAMILY MEDICINE

## 2017-11-16 PROCEDURE — G8417 CALC BMI ABV UP PARAM F/U: HCPCS | Performed by: FAMILY MEDICINE

## 2017-11-16 RX ORDER — KETOROLAC TROMETHAMINE 30 MG/ML
60 INJECTION, SOLUTION INTRAMUSCULAR; INTRAVENOUS ONCE
Status: COMPLETED | OUTPATIENT
Start: 2017-11-16 | End: 2017-11-16

## 2017-11-16 RX ADMIN — KETOROLAC TROMETHAMINE 60 MG: 30 INJECTION, SOLUTION INTRAMUSCULAR; INTRAVENOUS at 15:33

## 2017-11-27 NOTE — PROGRESS NOTES
Chief Complaint   Patient presents with    Headache     x 2 wks    headache ×2 weeks  Some encephalopathy symptoms as well  No specific numbness or tingling  Would like pain injection and MRI brain for evaluation    Edema has been relatively stable at this time  She would like blood work  Recommend low sodium diet  Weight is stable  May be manifestation of fibromyalgia  She declines more testing    fatigue symptoms are unfortunately worsening  She would like additional blood work  Discussed difficulty with work    Myalgia has been relatively stable  Fibro sx  Status post pain dr kirkland  saw rheum as well  Will reevaluate with pain specialist    Vit d deficiency stable  rec supplement  Recommend blood work    Mood no real change  No new issues reported  No suicidal/homocidal ideation. No psychotic or manic symptoms. Patient presents for exam.      Patient Active Problem List   Diagnosis    Myalgia    Arthralgia    Fatigue    Generalized edema    Fibromyalgia    Cervical spondylosis without myelopathy    Spondylosis of lumbosacral region without myelopathy or radiculopathy    Chronic pain syndrome    Contusion of foot    History of  section    History of laparoscopy    Mixed conductive and sensorineural hearing loss, unilateral with unrestricted hearing on the contralateral side    Hearing loss, sensory, unilateral    Delivery normal    Vitamin D deficiency    Mixed anxiety depressive disorder    Mild intermittent asthma without complication    Tobacco abuse    Elevated liver enzymes       has a current medication list which includes the following prescription(s): proair hfa, buspirone, milnacipran hcl, mirtazapine, carvedilol, amphetamine-dextroamphetamine, methotrexate, ketoconazole, etodolac, brimonidine-timolol, etodolac, gabapentin, and sumatriptan.     Past Medical History:   Diagnosis Date    ADHD (attention deficit hyperactivity disorder)     Anxiety     Asthma     Chronic back pain     Depression     Fibromyalgia     Headache     Osteoarthritis        Past Surgical History:   Procedure Laterality Date     SECTION      EYE SURGERY Right 2017    DR. TRISTAN    KNEE ARTHROSCOPY Right 2006    SHOULDER SURGERY Right     TUBAL LIGATION         [unfilled]    family history includes Arthritis in her mother; Depression in her brother, father, mother, and sister; High Blood Pressure in her mother; High Cholesterol in her mother; Learning Disabilities in her brother. Social History     Social History    Marital status: Single     Spouse name: N/A    Number of children: N/A    Years of education: N/A     Occupational History    Not on file.      Social History Main Topics    Smoking status: Current Every Day Smoker     Types: Cigarettes    Smokeless tobacco: Never Used    Alcohol use 1.2 oz/week     2 Glasses of wine per week    Drug use: No    Sexual activity: Yes     Partners: Male     Birth control/ protection: Surgical     Other Topics Concern    Not on file     Social History Narrative    No narrative on file       Allergies   Allergen Reactions    Aleve [Naproxen] Other (See Comments)     Took to much     Magnesium Oxide -Mg Supplement Nausea Only    Naproxen Sodium Other (See Comments)     Insomnia    Onion Swelling     Throat swelling     Penicillins Hives    Prednisone Other (See Comments)     Vision problems per patient        Review of Systems - General ROS: negative  Psychological ROS: negative  ENT ROS: negative  Hematological and Lymphatic ROS: negative  Endocrine ROS: negative  Respiratory ROS: no cough, shortness of breath, or wheezing  Cardiovascular ROS: no chest pain or dyspnea on exertion  Gastrointestinal ROS: no abdominal pain, change in bowel habits, or black or bloody stools  Genito-Urinary ROS: no dysuria, trouble voiding, or hematuria  Musculoskeletal ROS: pain  Neurological ROS: no TIA or stroke symptoms  Dermatological ROS: Negative    Blood pressure 130/80, pulse 126, temperature 98.6 °F (37 °C), temperature source Temporal, resp. rate 16, height 5' 7\" (1.702 m), weight 164 lb (74.4 kg), SpO2 97 %, not currently breastfeeding. Physical Examination: General appearance - alert, well appearing, and in no distress  Mental status - alert, oriented to person, place, and time  Eyes - pupils equal and reactive, extraocular eye movements intact  Ears - bilateral TM's and external ear canals normal  Mouth - mucous membranes moist, pharynx normal without lesions  Neck - supple, no significant adenopathy  Lymphatics - no palpable lymphadenopathy, no hepatosplenomegaly  Chest - clear to auscultation, no wheezes, rales or rhonchi, symmetric air entry  Heart - normal rate, regular rhythm, normal S1, S2, no murmurs, rubs, clicks or gallops  Abdomen - soft, nontender, nondistended, no masses or organomegaly  Neurological - alert, oriented, normal speech, no focal findings or movement disorder noted  Musculoskeletal -multiple fibromyalgia pain spots, o/w no joint tenderness, deformity or swelling  Skin - no rash    Assessment:    1. Nonintractable headache, unspecified chronicity pattern, unspecified headache type  MRI Brain W WO Contrast   2. Myalgia     3. Arthralgia of right knee     4. Fatigue, unspecified type     5. Generalized edema     6. Fibromyalgia     7. Cervical spondylosis without myelopathy     8. Spondylosis of lumbosacral region without myelopathy or radiculopathy     9.  Chronic pain syndrome         Plan:          Orders Placed This Encounter   Procedures    MRI Brain W WO Contrast     Standing Status:   Future     Standing Expiration Date:   11/16/2018       Outpatient Encounter Prescriptions as of 11/16/2017   Medication Sig Dispense Refill    PROAIR  (90 Base) MCG/ACT inhaler Inhale 2 puffs into the lungs every 6 hours as needed for Wheezing 3 Inhaler 3    [DISCONTINUED] etodolac (LODINE) 400 MG tablet TAKE 1 TABLET BY MOUTH 2 TIMES DAILY 60 tablet 3    busPIRone (BUSPAR) 7.5 MG tablet Take 1 tablet by mouth 2 times daily 60 tablet 3    milnacipran HCl (SAVELLA) 100 MG TABS Take 1 tablet by mouth 2 times daily 60 tablet 3    mirtazapine (REMERON) 30 MG tablet TAKE 1 TABLET BY MOUTH NIGHTLY 30 tablet 5    carvedilol (COREG) 3.125 MG tablet Take 1 tablet by mouth 2 times daily 60 tablet 3    amphetamine-dextroamphetamine (ADDERALL XR) 20 MG extended release capsule Take 1 capsule by mouth daily  .  methotrexate (RHEUMATREX) 2.5 MG chemo tablet Take 1 tablet by mouth four times a week      [DISCONTINUED] gabapentin (NEURONTIN) 600 MG tablet Take 1 tablet by mouth 3 times daily 180 tablet 3    ketoconazole (NIZORAL) 2 % shampoo Apply topically daily as needed. 120 mL 3    etodolac (LODINE) 400 MG tablet Take 1 tablet by mouth 2 times daily 60 tablet 3    brimonidine-timolol (COMBIGAN) 0.2-0.5 % ophthalmic solution Use in left eye      [DISCONTINUED] magnesium oxide (MAG-OX) 400 (240 Mg) MG tablet Take 1 tablet by mouth daily 30 tablet 0    [DISCONTINUED] PROAIR  (90 BASE) MCG/ACT inhaler Inhale 2 puffs into the lungs every 6 hours as needed for Wheezing 3 Inhaler 3    SUMAtriptan (IMITREX) 50 MG tablet Take 1 tablet by mouth once as needed for Migraine 9 tablet 3    [] ketorolac (TORADOL) injection 60 mg        No facility-administered encounter medications on file as of 2017. Report any suicidal/homocidal ideation, any psychotic or manic symptoms. Recommend lifestyle modification. Monitor liver blood work at next visit  Keep specialist evaluation  Return in about 3 months (around 2018). Patient education provided. They understand and agree with this course of treatment. They will return with new or worsening symptoms. Patient instructed to remain current with appropriate annual health maintenance.

## 2017-12-11 RX ORDER — CARVEDILOL 3.12 MG/1
TABLET ORAL
Qty: 60 TABLET | Refills: 3 | Status: SHIPPED | OUTPATIENT
Start: 2017-12-11 | End: 2018-04-25 | Stop reason: SDUPTHER

## 2017-12-15 RX ORDER — CARVEDILOL 3.12 MG/1
TABLET ORAL
Qty: 60 TABLET | Refills: 3 | OUTPATIENT
Start: 2017-12-15

## 2017-12-15 NOTE — TELEPHONE ENCOUNTER
From: Nadeem Ware  Sent: 12/15/2017 10:51 AM EST  Subject: Medication Renewal Request    Nadeem Ware would like a refill of the following medications:  carvedilol (COREG) 3.125 MG tablet Luke Higgins MD]    Preferred pharmacy: [facility]/PHARMACY [address] [place] [address]    Comment:

## 2018-01-09 ENCOUNTER — TELEPHONE (OUTPATIENT)
Dept: FAMILY MEDICINE CLINIC | Age: 48
End: 2018-01-09

## 2018-01-09 DIAGNOSIS — Z71.89 HEARING AID CONSULTATION: Primary | ICD-10-CM

## 2018-01-22 ENCOUNTER — INITIAL CONSULT (OUTPATIENT)
Dept: PODIATRY | Facility: CLINIC | Age: 48
End: 2018-01-22

## 2018-01-22 VITALS — BODY MASS INDEX: 25.9 KG/M2 | WEIGHT: 165 LBS | TEMPERATURE: 98.3 F | HEIGHT: 67 IN

## 2018-01-22 DIAGNOSIS — M19.171 POST-TRAUMATIC OSTEOARTHRITIS OF FEET, BILATERAL: ICD-10-CM

## 2018-01-22 DIAGNOSIS — M79.672 PAIN IN BOTH FEET: ICD-10-CM

## 2018-01-22 DIAGNOSIS — M79.671 PAIN IN BOTH FEET: ICD-10-CM

## 2018-01-22 DIAGNOSIS — M19.172 POST-TRAUMATIC OSTEOARTHRITIS OF FEET, BILATERAL: ICD-10-CM

## 2018-01-22 DIAGNOSIS — M65.872 OTHER SYNOVITIS AND TENOSYNOVITIS, LEFT ANKLE AND FOOT: ICD-10-CM

## 2018-01-22 DIAGNOSIS — M79.672 PAIN IN BOTH FEET: Primary | ICD-10-CM

## 2018-01-22 DIAGNOSIS — S93.491S SPRAIN OF ANTERIOR TALOFIBULAR LIGAMENT OF RIGHT ANKLE, SEQUELA: ICD-10-CM

## 2018-01-22 DIAGNOSIS — M76.72 PERONEAL TENDONITIS OF LEFT LOWER EXTREMITY: ICD-10-CM

## 2018-01-22 DIAGNOSIS — M65.871 OTHER SYNOVITIS AND TENOSYNOVITIS, RIGHT ANKLE AND FOOT: ICD-10-CM

## 2018-01-22 DIAGNOSIS — S93.492S SPRAIN OF ANTERIOR TALOFIBULAR LIGAMENT OF LEFT ANKLE, SEQUELA: ICD-10-CM

## 2018-01-22 DIAGNOSIS — M79.671 PAIN IN BOTH FEET: Primary | ICD-10-CM

## 2018-01-22 PROBLEM — S93.492A SPRAIN OF ANTERIOR TALOFIBULAR LIGAMENT OF LEFT ANKLE: Status: ACTIVE | Noted: 2018-01-22

## 2018-01-22 ASSESSMENT — ENCOUNTER SYMPTOMS
DIARRHEA: 0
NAUSEA: 0
BACK PAIN: 0
CONSTIPATION: 0
SHORTNESS OF BREATH: 0

## 2018-01-22 NOTE — PROGRESS NOTES
mouth 2 times daily 60 tablet 3    gabapentin (NEURONTIN) 600 MG tablet Take 1 tablet by mouth 3 times daily 180 tablet 3    PROAIR  (90 Base) MCG/ACT inhaler Inhale 2 puffs into the lungs every 6 hours as needed for Wheezing 3 Inhaler 3    busPIRone (BUSPAR) 7.5 MG tablet Take 1 tablet by mouth 2 times daily 60 tablet 3    milnacipran HCl (SAVELLA) 100 MG TABS Take 1 tablet by mouth 2 times daily 60 tablet 3    mirtazapine (REMERON) 30 MG tablet TAKE 1 TABLET BY MOUTH NIGHTLY 30 tablet 5    amphetamine-dextroamphetamine (ADDERALL XR) 20 MG extended release capsule Take 1 capsule by mouth daily  .  methotrexate (RHEUMATREX) 2.5 MG chemo tablet Take 1 tablet by mouth four times a week      ketoconazole (NIZORAL) 2 % shampoo Apply topically daily as needed. 120 mL 3    etodolac (LODINE) 400 MG tablet Take 1 tablet by mouth 2 times daily 60 tablet 3    SUMAtriptan (IMITREX) 50 MG tablet Take 1 tablet by mouth once as needed for Migraine 9 tablet 3    brimonidine-timolol (COMBIGAN) 0.2-0.5 % ophthalmic solution Use in left eye       No current facility-administered medications on file prior to visit. Past Medical History:   Diagnosis Date    ADHD (attention deficit hyperactivity disorder)     Anxiety     Asthma     Chronic back pain     Depression     Fibromyalgia     Headache     Osteoarthritis      Past Surgical History:   Procedure Laterality Date     SECTION      EYE SURGERY Right 2017    DR. TRISTAN    KNEE ARTHROSCOPY Right 2006    SHOULDER SURGERY Right 2004    TUBAL LIGATION       Social History     Social History    Marital status: Single     Spouse name: N/A    Number of children: N/A    Years of education: N/A     Occupational History    Not on file.      Social History Main Topics    Smoking status: Current Every Day Smoker     Types: Cigarettes    Smokeless tobacco: Never Used    Alcohol use 1.2 oz/week     2 Glasses of wine per week    Drug use: mortise is intact, no fractures or appreciable talar dome lesion noted. Assessment:     1. Pain in both feet  XR ANKLE LEFT (MIN 3 VIEWS)    XR FOOT LEFT (MIN 3 VIEWS)   2. Other synovitis and tenosynovitis, right ankle and foot     3. Other synovitis and tenosynovitis, left ankle and foot  XR ANKLE LEFT (MIN 3 VIEWS)    XR FOOT LEFT (MIN 3 VIEWS)    TN AFO ANKLE GAUNTLET PRE OTS   4. Peroneal tendonitis of left lower extremity  Ambulatory referral to Physical Therapy    TN AFO ANKLE GAUNTLET PRE OTS   5. Sprain of anterior talofibular ligament of left ankle, sequela  Ambulatory referral to Physical Therapy    TN AFO ANKLE GAUNTLET PRE OTS   6. Post-traumatic osteoarthritis of feet, bilateral  XR ANKLE LEFT (MIN 3 VIEWS)    Ambulatory referral to Physical Therapy   7. Sprain of anterior talofibular ligament of right ankle, sequela         Plan:     I discussed the etiology and treatment options in detail today. I have ordered x-rays of the left foot and ankle, x-rays of the right foot and ankle were reviewed. I have prescribed an ankle brace, she is to wear this on the left lower extremity at all times standing and walking. I have prescribed formal physical therapy. All questions were answered.     Orders Placed This Encounter   Procedures    XR ANKLE LEFT (MIN 3 VIEWS)     Standing Status:   Future     Standing Expiration Date:   4/22/2018     Order Specific Question:   Reason for exam:     Answer:   chronic pain, joint stiffness, trauma    XR FOOT LEFT (MIN 3 VIEWS)     Standing Status:   Future     Standing Expiration Date:   4/22/2018     Order Specific Question:   Reason for exam:     Answer:   pain and inflammation    Ambulatory referral to Physical Therapy     Referral Priority:   Routine     Referral Type:   Eval and Treat     Requested Specialty:   Physical Therapy     Number of Visits Requested:   1    TN AFO ANKLE GAUNTLET PRE OTS     Standing Status:   Future     Standing Expiration Date:   4/22/2018         Follow up:  Return in about 3 weeks (around 2/12/2018).     Janet Kaur DPM

## 2018-01-30 ENCOUNTER — OFFICE VISIT (OUTPATIENT)
Dept: FAMILY MEDICINE CLINIC | Age: 48
End: 2018-01-30
Payer: COMMERCIAL

## 2018-01-30 VITALS
DIASTOLIC BLOOD PRESSURE: 64 MMHG | RESPIRATION RATE: 16 BRPM | BODY MASS INDEX: 25.9 KG/M2 | OXYGEN SATURATION: 98 % | WEIGHT: 165 LBS | HEIGHT: 67 IN | HEART RATE: 118 BPM | SYSTOLIC BLOOD PRESSURE: 122 MMHG | TEMPERATURE: 98.3 F

## 2018-01-30 DIAGNOSIS — M25.561 ARTHRALGIA OF RIGHT KNEE: ICD-10-CM

## 2018-01-30 DIAGNOSIS — R53.83 FATIGUE, UNSPECIFIED TYPE: ICD-10-CM

## 2018-01-30 DIAGNOSIS — R60.1 GENERALIZED EDEMA: ICD-10-CM

## 2018-01-30 DIAGNOSIS — M47.817 SPONDYLOSIS OF LUMBOSACRAL REGION WITHOUT MYELOPATHY OR RADICULOPATHY: Primary | ICD-10-CM

## 2018-01-30 DIAGNOSIS — M47.812 CERVICAL SPONDYLOSIS WITHOUT MYELOPATHY: ICD-10-CM

## 2018-01-30 DIAGNOSIS — M79.7 FIBROMYALGIA: ICD-10-CM

## 2018-01-30 PROCEDURE — G8427 DOCREV CUR MEDS BY ELIG CLIN: HCPCS | Performed by: FAMILY MEDICINE

## 2018-01-30 PROCEDURE — 99214 OFFICE O/P EST MOD 30 MIN: CPT | Performed by: FAMILY MEDICINE

## 2018-01-30 PROCEDURE — 4004F PT TOBACCO SCREEN RCVD TLK: CPT | Performed by: FAMILY MEDICINE

## 2018-01-30 PROCEDURE — G8484 FLU IMMUNIZE NO ADMIN: HCPCS | Performed by: FAMILY MEDICINE

## 2018-01-30 PROCEDURE — G8417 CALC BMI ABV UP PARAM F/U: HCPCS | Performed by: FAMILY MEDICINE

## 2018-01-30 RX ORDER — VENLAFAXINE HYDROCHLORIDE 37.5 MG/1
37.5 CAPSULE, EXTENDED RELEASE ORAL DAILY
Qty: 30 CAPSULE | Refills: 5 | Status: SHIPPED | OUTPATIENT
Start: 2018-01-30 | End: 2018-04-20 | Stop reason: DRUGHIGH

## 2018-01-30 NOTE — PROGRESS NOTES
Asthma     Chronic back pain     Depression     Fibromyalgia     Headache     Osteoarthritis        Past Surgical History:   Procedure Laterality Date     SECTION      EYE SURGERY Right 2017    DR. TRISTAN    KNEE ARTHROSCOPY Right 2006    SHOULDER SURGERY Right     TUBAL LIGATION         @[facility]@    family history includes Arthritis in her mother; Depression in her brother, father, mother, and sister; High Blood Pressure in her mother; High Cholesterol in her mother; Learning Disabilities in her brother. Social History     Social History    Marital status: Single     Spouse name: N/A    Number of children: N/A    Years of education: N/A     Occupational History    Not on file.      Social History Main Topics    Smoking status: Current Every Day Smoker     Types: Cigarettes    Smokeless tobacco: Never Used    Alcohol use 1.2 oz/week     2 Glasses of wine per week    Drug use: No    Sexual activity: Yes     Partners: Male     Birth control/ protection: Surgical     Other Topics Concern    Not on file     Social History Narrative    No narrative on file       Allergies   Allergen Reactions    Aleve [Naproxen] Other (See Comments)     Took to much     Magnesium Oxide -Mg Supplement Nausea Only    Naproxen Sodium Other (See Comments)     Insomnia    Onion Swelling     Throat swelling     Penicillins Hives    Prednisone Other (See Comments)     Vision problems per patient        Review of Systems - General ROS: negative  Psychological ROS: negative  ENT ROS: negative  Hematological and Lymphatic ROS: negative  Endocrine ROS: negative  Respiratory ROS: no cough, shortness of breath, or wheezing  Cardiovascular ROS: no chest pain or dyspnea on exertion  Gastrointestinal ROS: no abdominal pain, change in bowel habits, or black or bloody stools  Genito-Urinary ROS: no dysuria, trouble voiding, or hematuria  Musculoskeletal ROS: pain  Neurological ROS: no TIA or stroke symptoms  Dermatological ROS: Negative    Blood pressure 122/64, pulse 118, temperature 98.3 °F (36.8 °C), temperature source Temporal, resp. rate 16, height 5' 7\" (1.702 m), weight 165 lb (74.8 kg), last menstrual period 12/28/2017, SpO2 98 %, not currently breastfeeding. Physical Examination: General appearance - alert, well appearing, and in no distress  Mental status - alert, oriented to person, place, and time  Eyes - pupils equal and reactive, extraocular eye movements intact  Ears - bilateral TM's and external ear canals normal  Mouth - mucous membranes moist, pharynx normal without lesions  Neck - supple, no significant adenopathy  Lymphatics - no palpable lymphadenopathy, no hepatosplenomegaly  Chest - clear to auscultation, no wheezes, rales or rhonchi, symmetric air entry  Heart - normal rate, regular rhythm, normal S1, S2, no murmurs, rubs, clicks or gallops  Abdomen - soft, nontender, nondistended, no masses or organomegaly  Neurological - alert, oriented, normal speech, no focal findings or movement disorder noted  Musculoskeletal -multiple fibromyalgia pain spots, o/w no joint tenderness, deformity or swelling  Skin - no rash    Assessment:    1. Spondylosis of lumbosacral region without myelopathy or radiculopathy     2. Cervical spondylosis without myelopathy     3. Fibromyalgia     4. Generalized edema     5. Fatigue, unspecified type     6. Arthralgia of right knee         Plan:          No orders of the defined types were placed in this encounter.       Outpatient Encounter Prescriptions as of 1/30/2018   Medication Sig Dispense Refill    venlafaxine (EFFEXOR XR) 37.5 MG extended release capsule Take 1 capsule by mouth daily 30 capsule 5    carvedilol (COREG) 3.125 MG tablet TAKE 1 TABLET TWICE A DAY 60 tablet 3    etodolac (LODINE) 400 MG tablet Take 1 tablet by mouth 2 times daily 60 tablet 3    gabapentin (NEURONTIN) 600 MG tablet Take 1 tablet by mouth 3 times daily 180 tablet 3   

## 2018-02-12 ENCOUNTER — OFFICE VISIT (OUTPATIENT)
Dept: PODIATRY | Facility: CLINIC | Age: 48
End: 2018-02-12

## 2018-02-12 VITALS — WEIGHT: 165 LBS | HEIGHT: 67 IN | TEMPERATURE: 97.1 F | BODY MASS INDEX: 25.9 KG/M2

## 2018-02-12 DIAGNOSIS — M76.72 PERONEAL TENDONITIS OF LEFT LOWER EXTREMITY: ICD-10-CM

## 2018-02-12 DIAGNOSIS — M79.671 PAIN IN BOTH FEET: Primary | ICD-10-CM

## 2018-02-12 DIAGNOSIS — S93.492D SPRAIN OF ANTERIOR TALOFIBULAR LIGAMENT OF LEFT ANKLE, SUBSEQUENT ENCOUNTER: ICD-10-CM

## 2018-02-12 DIAGNOSIS — M65.872 OTHER SYNOVITIS AND TENOSYNOVITIS, LEFT ANKLE AND FOOT: ICD-10-CM

## 2018-02-12 DIAGNOSIS — M79.672 PAIN IN BOTH FEET: Primary | ICD-10-CM

## 2018-02-12 DIAGNOSIS — M65.871 OTHER SYNOVITIS AND TENOSYNOVITIS, RIGHT ANKLE AND FOOT: ICD-10-CM

## 2018-02-12 ASSESSMENT — ENCOUNTER SYMPTOMS
DIARRHEA: 0
SHORTNESS OF BREATH: 0
CONSTIPATION: 0
NAUSEA: 0
BACK PAIN: 0

## 2018-02-12 NOTE — PROGRESS NOTES
times daily 60 tablet 3    milnacipran HCl (SAVELLA) 100 MG TABS Take 1 tablet by mouth 2 times daily 60 tablet 3    mirtazapine (REMERON) 30 MG tablet TAKE 1 TABLET BY MOUTH NIGHTLY 30 tablet 5    amphetamine-dextroamphetamine (ADDERALL XR) 20 MG extended release capsule Take 1 capsule by mouth daily  .  methotrexate (RHEUMATREX) 2.5 MG chemo tablet Take 8 tablets by mouth once a week       ketoconazole (NIZORAL) 2 % shampoo Apply topically daily as needed. 120 mL 3    etodolac (LODINE) 400 MG tablet Take 1 tablet by mouth 2 times daily 60 tablet 3    SUMAtriptan (IMITREX) 50 MG tablet Take 1 tablet by mouth once as needed for Migraine 9 tablet 3    brimonidine-timolol (COMBIGAN) 0.2-0.5 % ophthalmic solution Use in left eye       No current facility-administered medications on file prior to visit. Past Medical History:   Diagnosis Date    ADHD (attention deficit hyperactivity disorder)     Anxiety     Asthma     Chronic back pain     Depression     Fibromyalgia     Headache     Osteoarthritis      Past Surgical History:   Procedure Laterality Date     SECTION      EYE SURGERY Right 2017    DR. TRISTAN    KNEE ARTHROSCOPY Right 2006    SHOULDER SURGERY Right 2004    TUBAL LIGATION       Social History     Social History    Marital status: Single     Spouse name: N/A    Number of children: N/A    Years of education: N/A     Occupational History    Not on file.      Social History Main Topics    Smoking status: Current Every Day Smoker     Types: Cigarettes    Smokeless tobacco: Never Used    Alcohol use 1.2 oz/week     2 Glasses of wine per week    Drug use: No    Sexual activity: Yes     Partners: Male     Birth control/ protection: Surgical     Other Topics Concern    Not on file     Social History Narrative    No narrative on file     Family History   Problem Relation Age of Onset    Arthritis Mother     Depression Mother     High Blood Pressure Mother  High Cholesterol Mother     Depression Father     Depression Sister     Depression Brother     Learning Disabilities Brother            Objective:   Vitals:  Temp 97.1 °F (36.2 °C) (Tympanic)   Ht 5' 7\" (1.702 m)   Wt 165 lb (74.8 kg)   LMP 12/28/2017   BMI 25.84 kg/m² Pain Score:   4    Physical Exam    Vascular:   Dorsalis pedis pulse is graded at 2/4 bilateral foot. Posterior tibial pulses graded at 2/4 bilateral foot. There is no edema noted to the bilateral lower extremity. Capillary refill is immediate bilateral hallux. There are no varicosities noted bilaterally. There are no telangiectasia noted bilaterally. Skin temperature gradient is noted to be warm to warm bilaterally. There are no signs of ischemia or skin atrophy noted bilaterally. Hair growth is present bilaterally.     Neurological:   Protective sensation is intact bilaterally. Vibratory sensation is intact bilaterally. Sharp/dull sensation is intact bilaterally. Patellar deep tendon reflex is graded at 2/4 bilaterally. Achilles tendon deep tendon reflex is graded at 2/4 bilaterally.     Dermatological:  No open lesions noted bilateral foot. The toenails are thickened and incurvated, there are no signs of acute bacterial infection noted. Dry skin texture with normal skin turgor noted bilaterally. Webspaces are intact bilateral foot.     Musculoskeletal:  Rectus foot type noted bilaterally. Mild tailor's bunion deformity with adductovarus fifth digit deformity noted bilaterally. Manual muscle strength is graded at 5/5 for all groups tested bilateral foot. There is pain with resisted inversion and eversion of the left foot. There is mild pain on palpation of the insertion of the left peroneus brevis tendon at the base of the fifth metatarsal, there is mild pain on palpation of the insertion of the left posterior tibial tendon at the navicular bone.   There is mild pain on palpation and with range of motion of the 25 MPJs left foot, 4 and 5 MPJ right foot. There is pain on range of motion of the central TMTJs bilaterally. Painful decreased range of motion without crepitus noted to bilateral subtalar and ankle joints. Decreased ankle joint dorsiflexion bilaterally, soft endpoint. There is pain on palpation of the Achilles tendon insertion an retrocalcaneal bursa bilaterally. There is mild pain on palpation of the medial calcaneal tubercle bilaterally. There is mild pain on palpation of the right ankle ATFL. Right ankle anterior drawer, distal squeeze, talar tilt, and external rotation exams are normal.  There is moderate pain on palpation of the left ankle ATFL and ankle gutters. There is a negative distal squeeze exam to the left ankle. The left ankle anterior drawer, talar tilt, and external rotation exams are normal.    Assessment:     1. Pain in both feet     2. Peroneal tendonitis of left lower extremity     3. Sprain of anterior talofibular ligament of left ankle, subsequent encounter     4. Other synovitis and tenosynovitis, right ankle and foot     5. Other synovitis and tenosynovitis, left ankle and foot         Plan:     I instructed the patient to continue physical therapy. The patient is to continue use of the left ankle AFO daily. She is to remove the brace nightly and perform range of motion exercises. I instructed the patient to apply an ice pack to the insertion point of the posterior tibial tendon at the medial aspect of the left foot. All questions were answered. Follow up:  Return in about 4 weeks (around 3/12/2018).     Digna Barahona DPM

## 2018-02-21 ENCOUNTER — OFFICE VISIT (OUTPATIENT)
Dept: PODIATRY | Facility: CLINIC | Age: 48
End: 2018-02-21

## 2018-02-21 VITALS — HEIGHT: 67 IN | WEIGHT: 168 LBS | BODY MASS INDEX: 26.37 KG/M2

## 2018-02-21 DIAGNOSIS — S93.492D SPRAIN OF ANTERIOR TALOFIBULAR LIGAMENT OF LEFT ANKLE, SUBSEQUENT ENCOUNTER: ICD-10-CM

## 2018-02-21 DIAGNOSIS — M76.822 POSTERIOR TIBIAL TENDINITIS OF LEFT LEG: ICD-10-CM

## 2018-02-21 DIAGNOSIS — M76.72 PERONEAL TENDINITIS OF LEFT LOWER EXTREMITY: ICD-10-CM

## 2018-02-21 DIAGNOSIS — M79.672 LEFT FOOT PAIN: Primary | ICD-10-CM

## 2018-02-21 ASSESSMENT — ENCOUNTER SYMPTOMS
BACK PAIN: 0
CONSTIPATION: 0
DIARRHEA: 0
SHORTNESS OF BREATH: 0
NAUSEA: 0

## 2018-02-21 NOTE — PROGRESS NOTES
Take 1 tablet by mouth 2 times daily 60 tablet 3    milnacipran HCl (SAVELLA) 100 MG TABS Take 1 tablet by mouth 2 times daily 60 tablet 3    mirtazapine (REMERON) 30 MG tablet TAKE 1 TABLET BY MOUTH NIGHTLY 30 tablet 5    amphetamine-dextroamphetamine (ADDERALL XR) 20 MG extended release capsule Take 1 capsule by mouth daily  .  methotrexate (RHEUMATREX) 2.5 MG chemo tablet Take 8 tablets by mouth once a week       ketoconazole (NIZORAL) 2 % shampoo Apply topically daily as needed. 120 mL 3    etodolac (LODINE) 400 MG tablet Take 1 tablet by mouth 2 times daily 60 tablet 3    SUMAtriptan (IMITREX) 50 MG tablet Take 1 tablet by mouth once as needed for Migraine 9 tablet 3    brimonidine-timolol (COMBIGAN) 0.2-0.5 % ophthalmic solution Use in left eye       No current facility-administered medications on file prior to visit. Past Medical History:   Diagnosis Date    ADHD (attention deficit hyperactivity disorder)     Anxiety     Asthma     Chronic back pain     Depression     Fibromyalgia     Headache     Osteoarthritis      Past Surgical History:   Procedure Laterality Date     SECTION      EYE SURGERY Right 2017    DR. TRISTAN    KNEE ARTHROSCOPY Right 2006    SHOULDER SURGERY Right 2004    TUBAL LIGATION       Social History     Social History    Marital status: Single     Spouse name: N/A    Number of children: N/A    Years of education: N/A     Occupational History    Not on file.      Social History Main Topics    Smoking status: Current Every Day Smoker     Types: Cigarettes    Smokeless tobacco: Never Used    Alcohol use 1.2 oz/week     2 Glasses of wine per week    Drug use: No    Sexual activity: Yes     Partners: Male     Birth control/ protection: Surgical     Other Topics Concern    Not on file     Social History Narrative    No narrative on file     Family History   Problem Relation Age of Onset    Arthritis Mother     Depression Mother     High

## 2018-02-21 NOTE — PATIENT INSTRUCTIONS
heal.  · Follow your doctor's advice for stretching and physical therapy. Gently move your joint through its full range of motion. This will prevent stiffness in your joint. · Go back to your activity slowly. Warm up before and stretch after the activity. You also can try making some changes. For example, if a sport caused your tendon pain, alternate the sport with another activity. If using a tool causes pain, switch hands or change your . Stop the activity if it hurts. After the activity, apply ice to prevent pain and swelling. · Do not smoke. Smoking can slow healing. If you need help quitting, talk to your doctor about stop-smoking programs and medicines. These can increase your chances of quitting for good. When should you call for help? Watch closely for changes in your health, and be sure to contact your doctor if:  ? · Your pain gets worse. ? · You do not get better as expected. Where can you learn more? Go to [email or web address]://[email or web address] org and sign in to your [employer] account. Enter A157 in the SecureLink box to learn more about \"Tendon Injury (Tendinopathy): Care Instructions. \"     If you do not have an account, please click on the \"Sign Up Now\" link. Current as of: March 21, 2017  Content Version: 11.5  © 1711-4144 Healthwise, Incorporated. Care instructions adapted under license by [facility] ([facility]). If you have questions about a medical condition or this instruction, always ask your healthcare professional. [place] [address] any warranty or liability for your use of this information.

## 2018-02-27 RX ORDER — MIRTAZAPINE 30 MG/1
TABLET, FILM COATED ORAL
Qty: 30 TABLET | Refills: 5 | Status: SHIPPED | OUTPATIENT
Start: 2018-02-27 | End: 2018-08-22 | Stop reason: SDUPTHER

## 2018-02-27 NOTE — TELEPHONE ENCOUNTER
pharmacy requesting refill. Please approve or deny this refill request. The order is pended. Thank you.     LOV 1/30/2018    Next Visit Date:  Future Appointments  Date Time Provider Arely Navarrete   2/28/2018 8:30 AM SCHEDULE, [facility] PT PhysicalTher None   3/1/2018 9:30 AM Coby Schroeder MD [facility] [place]   3/21/2018 8:00 AM Jc James DPM [facility]

## 2018-03-01 ENCOUNTER — OFFICE VISIT (OUTPATIENT)
Dept: FAMILY MEDICINE CLINIC | Age: 48
End: 2018-03-01
Payer: COMMERCIAL

## 2018-03-01 VITALS
TEMPERATURE: 98.4 F | RESPIRATION RATE: 18 BRPM | HEIGHT: 67 IN | HEART RATE: 88 BPM | DIASTOLIC BLOOD PRESSURE: 86 MMHG | WEIGHT: 168 LBS | BODY MASS INDEX: 26.37 KG/M2 | SYSTOLIC BLOOD PRESSURE: 124 MMHG

## 2018-03-01 DIAGNOSIS — M47.817 SPONDYLOSIS OF LUMBOSACRAL REGION WITHOUT MYELOPATHY OR RADICULOPATHY: ICD-10-CM

## 2018-03-01 DIAGNOSIS — M47.812 CERVICAL SPONDYLOSIS WITHOUT MYELOPATHY: ICD-10-CM

## 2018-03-01 DIAGNOSIS — J45.20 MILD INTERMITTENT ASTHMA WITHOUT COMPLICATION: ICD-10-CM

## 2018-03-01 DIAGNOSIS — M79.10 MYALGIA: Primary | ICD-10-CM

## 2018-03-01 DIAGNOSIS — L65.9 ALOPECIA: ICD-10-CM

## 2018-03-01 DIAGNOSIS — M25.561 ARTHRALGIA OF RIGHT KNEE: ICD-10-CM

## 2018-03-01 DIAGNOSIS — R74.8 ELEVATED LIVER ENZYMES: ICD-10-CM

## 2018-03-01 DIAGNOSIS — E55.9 VITAMIN D DEFICIENCY: ICD-10-CM

## 2018-03-01 DIAGNOSIS — R53.83 FATIGUE, UNSPECIFIED TYPE: ICD-10-CM

## 2018-03-01 DIAGNOSIS — F41.8 MIXED ANXIETY DEPRESSIVE DISORDER: ICD-10-CM

## 2018-03-01 DIAGNOSIS — R60.1 GENERALIZED EDEMA: ICD-10-CM

## 2018-03-01 DIAGNOSIS — M79.7 FIBROMYALGIA: ICD-10-CM

## 2018-03-01 PROCEDURE — 4004F PT TOBACCO SCREEN RCVD TLK: CPT | Performed by: FAMILY MEDICINE

## 2018-03-01 PROCEDURE — G8427 DOCREV CUR MEDS BY ELIG CLIN: HCPCS | Performed by: FAMILY MEDICINE

## 2018-03-01 PROCEDURE — G8484 FLU IMMUNIZE NO ADMIN: HCPCS | Performed by: FAMILY MEDICINE

## 2018-03-01 PROCEDURE — G8417 CALC BMI ABV UP PARAM F/U: HCPCS | Performed by: FAMILY MEDICINE

## 2018-03-01 PROCEDURE — 99214 OFFICE O/P EST MOD 30 MIN: CPT | Performed by: FAMILY MEDICINE

## 2018-03-01 RX ORDER — SUMATRIPTAN 50 MG/1
50 TABLET, FILM COATED ORAL
Qty: 9 TABLET | Refills: 3 | Status: SHIPPED | OUTPATIENT
Start: 2018-03-01 | End: 2018-03-01

## 2018-03-01 RX ORDER — FOLIC ACID 1 MG/1
TABLET ORAL
Refills: 5 | COMMUNITY
Start: 2018-01-31

## 2018-03-01 RX ORDER — BUSPIRONE HYDROCHLORIDE 7.5 MG/1
7.5 TABLET ORAL 2 TIMES DAILY
Qty: 60 TABLET | Refills: 3 | Status: SHIPPED | OUTPATIENT
Start: 2018-03-01 | End: 2018-06-07

## 2018-03-01 NOTE — PROGRESS NOTES
 SUMAtriptan (IMITREX) 50 MG tablet Take 1 tablet by mouth once as needed for Migraine 9 tablet 3    folic acid (FOLVITE) 1 MG tablet TAKE 1 TABLET EVERY DAY  5    desloratadine-pseudoephedrine (CLARINEX-D 24 HOUR) 5-240 MG per extended release tablet Take 1 tablet by mouth daily 30 tablet 3    mirtazapine (REMERON) 30 MG tablet TAKE 1 TABLET BY MOUTH NIGHTLY 30 tablet 5    venlafaxine (EFFEXOR XR) 37.5 MG extended release capsule Take 1 capsule by mouth daily 30 capsule 5    carvedilol (COREG) 3.125 MG tablet TAKE 1 TABLET TWICE A DAY 60 tablet 3    gabapentin (NEURONTIN) 600 MG tablet Take 1 tablet by mouth 3 times daily 180 tablet 3    PROAIR  (90 Base) MCG/ACT inhaler Inhale 2 puffs into the lungs every 6 hours as needed for Wheezing 3 Inhaler 3    milnacipran HCl (SAVELLA) 100 MG TABS Take 1 tablet by mouth 2 times daily 60 tablet 3    amphetamine-dextroamphetamine (ADDERALL XR) 20 MG extended release capsule Take 1 capsule by mouth daily  .  methotrexate (RHEUMATREX) 2.5 MG chemo tablet Take 8 tablets by mouth once a week       brimonidine-timolol (COMBIGAN) 0.2-0.5 % ophthalmic solution Use in left eye      [DISCONTINUED] etodolac (LODINE) 400 MG tablet Take 1 tablet by mouth 2 times daily 60 tablet 3    [DISCONTINUED] busPIRone (BUSPAR) 7.5 MG tablet Take 1 tablet by mouth 2 times daily 60 tablet 3    [DISCONTINUED] ketoconazole (NIZORAL) 2 % shampoo Apply topically daily as needed. 120 mL 3    [DISCONTINUED] etodolac (LODINE) 400 MG tablet Take 1 tablet by mouth 2 times daily 60 tablet 3    [DISCONTINUED] SUMAtriptan (IMITREX) 50 MG tablet Take 1 tablet by mouth once as needed for Migraine 9 tablet 3     No facility-administered encounter medications on file as of 3/1/2018. Report any suicidal/homocidal ideation, any psychotic or manic symptoms. Recommend lifestyle modification. Keep specialist evaluation  Return in about 3 months (around 6/1/2018).     Patient education provided. They understand and agree with this course of treatment. They will return with new or worsening symptoms. Patient instructed to remain current with appropriate annual health maintenance.

## 2018-03-01 NOTE — PATIENT INSTRUCTIONS
Patient Education        Stopping Smoking: Care Instructions  Your Care Instructions    Cigarette smokers crave the nicotine in cigarettes. Giving it up is much harder than simply changing a habit. Your body has to stop craving the nicotine. It is hard to quit, but you can do it. There are many tools that people use to quit smoking. You may find that combining tools works best for you. There are several steps to quitting. First you get ready to quit. Then you get support to help you. After that, you learn new skills and behaviors to become a nonsmoker. For many people, a necessary step is getting and using medicine. Your doctor will help you set up the plan that best meets your needs. You may want to attend a smoking cessation program to help you quit smoking. When you choose a program, look for one that has proven success. Ask your doctor for ideas. You will greatly increase your chances of success if you take medicine as well as get counseling or join a cessation program.  Some of the changes you feel when you first quit tobacco are uncomfortable. Your body will miss the nicotine at first, and you may feel short-tempered and grumpy. You may have trouble sleeping or concentrating. Medicine can help you deal with these symptoms. You may struggle with changing your smoking habits and rituals. The last step is the tricky one: Be prepared for the smoking urge to continue for a time. This is a lot to deal with, but keep at it. You will feel better. Follow-up care is a key part of your treatment and safety. Be sure to make and go to all appointments, and call your doctor if you are having problems. It's also a good idea to know your test results and keep a list of the medicines you take. How can you care for yourself at home? · Ask your family, friends, and coworkers for support. You have a better chance of quitting if you have help and support.   · Join a support group, such as Nicotine Anonymous, for people who are trying to quit smoking. · Consider signing up for a smoking cessation program, such as the American Lung Association's Freedom from Smoking program.  · Set a quit date. Pick your date carefully so that it is not right in the middle of a big deadline or stressful time. Once you quit, do not even take a puff. Get rid of all ashtrays and lighters after your last cigarette. Clean your house and your clothes so that they do not smell of smoke. · Learn how to be a nonsmoker. Think about ways you can avoid those things that make you reach for a cigarette. ¨ Avoid situations that put you at greatest risk for smoking. For some people, it is hard to have a drink with friends without smoking. For others, they might skip a coffee break with coworkers who smoke. ¨ Change your daily routine. Take a different route to work or eat a meal in a different place. · Cut down on stress. Calm yourself or release tension by doing an activity you enjoy, such as reading a book, taking a hot bath, or gardening. · Talk to your doctor or pharmacist about nicotine replacement therapy, which replaces the nicotine in your body. You still get nicotine but you do not use tobacco. Nicotine replacement products help you slowly reduce the amount of nicotine you need. These products come in several forms, many of them available over-the-counter:  ¨ Nicotine patches  ¨ Nicotine gum and lozenges  ¨ Nicotine inhaler  · Ask your doctor about bupropion (Wellbutrin) or varenicline (Chantix), which are prescription medicines. They do not contain nicotine. They help you by reducing withdrawal symptoms, such as stress and anxiety. · Some people find hypnosis, acupuncture, and massage helpful for ending the smoking habit. · Eat a healthy diet and get regular exercise. Having healthy habits will help your body move past its craving for nicotine. · Be prepared to keep trying. Most people are not successful the first few times they try to quit.  Do not

## 2018-03-02 ENCOUNTER — TELEPHONE (OUTPATIENT)
Dept: FAMILY MEDICINE CLINIC | Age: 48
End: 2018-03-02

## 2018-03-02 RX ORDER — LORATADINE AND PSEUDOEPHEDRINE 10; 240 MG/1; MG/1
1 TABLET, EXTENDED RELEASE ORAL DAILY
Qty: 30 TABLET | Refills: 3 | Status: SHIPPED | OUTPATIENT
Start: 2018-03-02

## 2018-03-21 ENCOUNTER — OFFICE VISIT (OUTPATIENT)
Dept: PODIATRY | Facility: CLINIC | Age: 48
End: 2018-03-21

## 2018-03-21 VITALS — WEIGHT: 165 LBS | TEMPERATURE: 97.6 F | HEIGHT: 67 IN | BODY MASS INDEX: 25.9 KG/M2

## 2018-03-21 DIAGNOSIS — M76.822 POSTERIOR TIBIAL TENDINITIS OF LEFT LEG: ICD-10-CM

## 2018-03-21 DIAGNOSIS — S93.492D SPRAIN OF ANTERIOR TALOFIBULAR LIGAMENT OF LEFT ANKLE, SUBSEQUENT ENCOUNTER: ICD-10-CM

## 2018-03-21 DIAGNOSIS — M79.672 LEFT FOOT PAIN: Primary | ICD-10-CM

## 2018-03-21 ASSESSMENT — ENCOUNTER SYMPTOMS
SHORTNESS OF BREATH: 0
BACK PAIN: 0
CONSTIPATION: 0
NAUSEA: 0
DIARRHEA: 0

## 2018-03-24 NOTE — TELEPHONE ENCOUNTER
PHARMACY REQUESTING REFILL  PATIENT LAST SEEN 3/1/18  LAST REFILL 10/17/17  PLEASE APPROVE OR DENY.        Future Appointments  Date Time Provider Arely Osorioi   4/23/2018 8:00 AM Digna Barahona DPM [facility]   6/1/2018 9:00 AM Mariaelena Kirby MD [address]

## 2018-03-26 RX ORDER — MILNACIPRAN HYDROCHLORIDE 100 MG/1
100 TABLET, FILM COATED ORAL 2 TIMES DAILY
Qty: 60 TABLET | Refills: 3 | Status: SHIPPED | OUTPATIENT
Start: 2018-03-26 | End: 2018-04-20

## 2018-04-20 RX ORDER — VENLAFAXINE HYDROCHLORIDE 75 MG/1
150 CAPSULE, EXTENDED RELEASE ORAL DAILY
Qty: 30 CAPSULE | Refills: 3 | Status: SHIPPED | OUTPATIENT
Start: 2018-04-20 | End: 2018-04-23 | Stop reason: SDUPTHER

## 2018-04-23 ENCOUNTER — OFFICE VISIT (OUTPATIENT)
Dept: PODIATRY | Facility: CLINIC | Age: 48
End: 2018-04-23

## 2018-04-23 ENCOUNTER — TELEPHONE (OUTPATIENT)
Dept: FAMILY MEDICINE CLINIC | Age: 48
End: 2018-04-23

## 2018-04-23 VITALS — HEIGHT: 67 IN | WEIGHT: 165 LBS | BODY MASS INDEX: 25.9 KG/M2 | TEMPERATURE: 97.3 F

## 2018-04-23 DIAGNOSIS — M76.72 PERONEAL TENDINITIS OF LEFT LOWER EXTREMITY: ICD-10-CM

## 2018-04-23 DIAGNOSIS — S93.492D SPRAIN OF ANTERIOR TALOFIBULAR LIGAMENT OF LEFT ANKLE, SUBSEQUENT ENCOUNTER: ICD-10-CM

## 2018-04-23 DIAGNOSIS — M79.672 LEFT FOOT PAIN: Primary | ICD-10-CM

## 2018-04-23 DIAGNOSIS — M76.822 POSTERIOR TIBIAL TENDINITIS OF LEFT LEG: ICD-10-CM

## 2018-04-23 RX ORDER — VENLAFAXINE HYDROCHLORIDE 75 MG/1
75 CAPSULE, EXTENDED RELEASE ORAL DAILY
Qty: 30 CAPSULE | Refills: 3 | Status: SHIPPED | OUTPATIENT
Start: 2018-04-23

## 2018-04-23 ASSESSMENT — ENCOUNTER SYMPTOMS
NAUSEA: 0
BACK PAIN: 0
DIARRHEA: 0
SHORTNESS OF BREATH: 0
CONSTIPATION: 0

## 2018-04-26 RX ORDER — CARVEDILOL 3.12 MG/1
TABLET ORAL
Qty: 60 TABLET | Refills: 3 | Status: SHIPPED | OUTPATIENT
Start: 2018-04-26 | End: 2018-08-22 | Stop reason: SDUPTHER

## 2018-07-23 ENCOUNTER — OFFICE VISIT (OUTPATIENT)
Dept: PODIATRY | Facility: CLINIC | Age: 48
End: 2018-07-23

## 2018-07-23 VITALS — HEIGHT: 67 IN | WEIGHT: 175 LBS | TEMPERATURE: 97.8 F | BODY MASS INDEX: 27.47 KG/M2

## 2018-07-23 DIAGNOSIS — S93.492D SPRAIN OF ANTERIOR TALOFIBULAR LIGAMENT OF LEFT ANKLE, SUBSEQUENT ENCOUNTER: ICD-10-CM

## 2018-07-23 DIAGNOSIS — M25.572 CHRONIC PAIN OF LEFT ANKLE: Primary | ICD-10-CM

## 2018-07-23 DIAGNOSIS — G89.29 CHRONIC PAIN OF LEFT ANKLE: Primary | ICD-10-CM

## 2018-07-23 DIAGNOSIS — M79.672 LEFT FOOT PAIN: ICD-10-CM

## 2018-07-23 DIAGNOSIS — M76.72 PERONEAL TENDONITIS OF LEFT LOWER EXTREMITY: ICD-10-CM

## 2018-07-23 DIAGNOSIS — S90.122A CONTUSION OF LEFT LESSER TOE(S) W/O DAMAGE TO NAIL, INIT: ICD-10-CM

## 2018-07-23 DIAGNOSIS — M65.872 OTHER SYNOVITIS AND TENOSYNOVITIS, LEFT ANKLE AND FOOT: ICD-10-CM

## 2018-07-23 ASSESSMENT — ENCOUNTER SYMPTOMS
DIARRHEA: 0
NAUSEA: 0
CONSTIPATION: 0
SHORTNESS OF BREATH: 0
BACK PAIN: 0

## 2018-07-23 NOTE — PROGRESS NOTES
Kate Weir  ([birth year])    7/23/18    Subjective     Kate Weir is 56 y.o. female who complains today of:    Chief Complaint   Patient presents with    Foot Pain     left    Toe Pain     left foot 5th digit injury        HPI : The patient presents for follow-up care for left ankle sprain. She reports continued minimal pain. There is slight swelling with no redness or bruising. The patient states that she had to wear the brace for 2 days in the past month, otherwise she has not used it on a regular basis. Patient reports compliance with home-going exercise plan. Patient also complains of left fifth toe pain, the patient states she her home so weeks ago. Patient denies swelling, bruising, or redness. There has been no self-care other than rest.    Review of Systems   Constitutional: Negative for fever. HENT: Negative for hearing loss. Respiratory: Negative for shortness of breath. Gastrointestinal: Negative for constipation, diarrhea and nausea. Genitourinary: Negative for difficulty urinating. Musculoskeletal: Positive for neck pain. Negative for back pain. Skin: Negative for rash. Neurological: Positive for headaches. Hematological: Bruises/bleeds easily. Psychiatric/Behavioral: Negative for sleep disturbance. She has tried physical therapy. Date of last of last PT treatment: February 2018         Allergies:  Aleve [naproxen]; Magnesium oxide -mg supplement; Naproxen sodium;  Onion; Penicillins; and Prednisone    Current Outpatient Prescriptions on File Prior to Visit   Medication Sig Dispense Refill    carvedilol (COREG) 3.125 MG tablet TAKE 1 TABLET BY MOUTH TWICE A DAY 60 tablet 3    venlafaxine (EFFEXOR XR) 75 MG extended release capsule Take 1 capsule by mouth daily 30 capsule 3    loratadine-pseudoephedrine (CLARITIN-D 24 HOUR)  MG per extended release tablet Take 1 tablet by mouth daily 30 tablet 3    SUMAtriptan (IMITREX) 50 MG tablet Take 1 tablet by mouth once as needed for Migraine 9 tablet 3    folic acid (FOLVITE) 1 MG tablet TAKE 1 TABLET EVERY DAY  5    mirtazapine (REMERON) 30 MG tablet TAKE 1 TABLET BY MOUTH NIGHTLY 30 tablet 5    gabapentin (NEURONTIN) 600 MG tablet Take 1 tablet by mouth 3 times daily 180 tablet 3    PROAIR  (90 Base) MCG/ACT inhaler Inhale 2 puffs into the lungs every 6 hours as needed for Wheezing 3 Inhaler 3    amphetamine-dextroamphetamine (ADDERALL XR) 20 MG extended release capsule Take 1 capsule by mouth daily  .  methotrexate (RHEUMATREX) 2.5 MG chemo tablet Take 8 tablets by mouth once a week       brimonidine-timolol (COMBIGAN) 0.2-0.5 % ophthalmic solution Use in left eye       No current facility-administered medications on file prior to visit. Past Medical History:   Diagnosis Date    ADHD (attention deficit hyperactivity disorder)     Anxiety     Asthma     Chronic back pain     Depression     Fibromyalgia     Headache     Osteoarthritis      Past Surgical History:   Procedure Laterality Date     SECTION      EYE SURGERY Right 2017    DR. TRISTAN    KNEE ARTHROSCOPY Right 2006    SHOULDER SURGERY Right     TUBAL LIGATION       Social History     Social History    Marital status: Single     Spouse name: N/A    Number of children: N/A    Years of education: N/A     Occupational History    Not on file.      Social History Main Topics    Smoking status: Current Every Day Smoker     Types: Cigarettes    Smokeless tobacco: Never Used    Alcohol use 1.2 oz/week     2 Glasses of wine per week    Drug use: No    Sexual activity: Yes     Partners: Male     Birth control/ protection: Surgical     Other Topics Concern    Not on file     Social History Narrative    No narrative on file     Family History   Problem Relation Age of Onset    Arthritis Mother     Depression Mother     High Blood Pressure Mother     High Cholesterol Mother     Depression

## 2018-08-22 NOTE — TELEPHONE ENCOUNTER
pharmacy requesting refill. Please approve or deny this refill request. The order is pended. Thank you.     LOV 3/1/2018    Next Visit Date:  Future Appointments  Date Time Provider Arely Navarrete   10/22/2018 8:00 AM Zayda Roberts DPM [facility]

## 2018-08-23 RX ORDER — VENLAFAXINE HYDROCHLORIDE 75 MG/1
150 CAPSULE, EXTENDED RELEASE ORAL DAILY
Qty: 30 CAPSULE | Refills: 3 | Status: SHIPPED | OUTPATIENT
Start: 2018-08-23 | End: 2018-11-07 | Stop reason: SDUPTHER

## 2018-08-23 RX ORDER — MIRTAZAPINE 30 MG/1
TABLET, FILM COATED ORAL
Qty: 30 TABLET | Refills: 5 | Status: SHIPPED | OUTPATIENT
Start: 2018-08-23

## 2018-08-23 RX ORDER — CARVEDILOL 3.12 MG/1
TABLET ORAL
Qty: 60 TABLET | Refills: 3 | Status: SHIPPED | OUTPATIENT
Start: 2018-08-23

## 2018-11-08 RX ORDER — VENLAFAXINE HYDROCHLORIDE 75 MG/1
150 CAPSULE, EXTENDED RELEASE ORAL DAILY
Qty: 30 CAPSULE | Refills: 3 | Status: SHIPPED | OUTPATIENT
Start: 2018-11-08

## 2019-03-13 ENCOUNTER — TELEPHONE (OUTPATIENT)
Dept: FAMILY MEDICINE CLINIC | Age: 49
End: 2019-03-13

## 2023-05-08 ENCOUNTER — OFFICE VISIT (OUTPATIENT)
Dept: PRIMARY CARE | Facility: CLINIC | Age: 53
End: 2023-05-08
Payer: COMMERCIAL

## 2023-05-08 VITALS
BODY MASS INDEX: 25.33 KG/M2 | DIASTOLIC BLOOD PRESSURE: 92 MMHG | SYSTOLIC BLOOD PRESSURE: 150 MMHG | HEART RATE: 61 BPM | OXYGEN SATURATION: 91 % | HEIGHT: 67 IN | TEMPERATURE: 97.3 F | WEIGHT: 161.4 LBS

## 2023-05-08 DIAGNOSIS — M15.9 PRIMARY OSTEOARTHRITIS INVOLVING MULTIPLE JOINTS: ICD-10-CM

## 2023-05-08 DIAGNOSIS — F32.A ANXIETY AND DEPRESSION: ICD-10-CM

## 2023-05-08 DIAGNOSIS — F41.9 ANXIETY AND DEPRESSION: ICD-10-CM

## 2023-05-08 DIAGNOSIS — M06.9 RHEUMATOID ARTHRITIS FLARE (MULTI): ICD-10-CM

## 2023-05-08 DIAGNOSIS — M79.7 FIBROMYALGIA: ICD-10-CM

## 2023-05-08 DIAGNOSIS — I10 PRIMARY HYPERTENSION: ICD-10-CM

## 2023-05-08 DIAGNOSIS — B36.0 TINEA VERSICOLOR: ICD-10-CM

## 2023-05-08 DIAGNOSIS — J45.30 MILD PERSISTENT ASTHMA WITHOUT COMPLICATION (HHS-HCC): Primary | ICD-10-CM

## 2023-05-08 PROBLEM — H47.013 ISCHEMIC OPTIC NEUROPATHY, BILATERAL: Status: ACTIVE | Noted: 2023-05-08

## 2023-05-08 PROBLEM — F33.0 MILD EPISODE OF RECURRENT MAJOR DEPRESSIVE DISORDER (CMS-HCC): Status: ACTIVE | Noted: 2023-05-08

## 2023-05-08 PROBLEM — D17.9 LIPOMA: Status: ACTIVE | Noted: 2023-05-08

## 2023-05-08 PROBLEM — M54.50 LOW BACK PAIN: Status: ACTIVE | Noted: 2023-05-08

## 2023-05-08 PROBLEM — F17.200 CURRENT SMOKER: Status: ACTIVE | Noted: 2019-03-14

## 2023-05-08 PROBLEM — M51.36 DDD (DEGENERATIVE DISC DISEASE), LUMBAR: Status: ACTIVE | Noted: 2017-01-23

## 2023-05-08 PROBLEM — M51.369 DDD (DEGENERATIVE DISC DISEASE), LUMBAR: Status: ACTIVE | Noted: 2017-01-23

## 2023-05-08 PROBLEM — M19.072 ARTHRITIS OF LEFT FOOT: Status: ACTIVE | Noted: 2020-05-06

## 2023-05-08 PROBLEM — R53.83 FATIGUE: Status: ACTIVE | Noted: 2023-05-08

## 2023-05-08 PROBLEM — R00.2 PALPITATIONS: Status: ACTIVE | Noted: 2019-02-18

## 2023-05-08 PROBLEM — H44.009 EYE INFECTION: Status: ACTIVE | Noted: 2023-05-08

## 2023-05-08 PROBLEM — E78.00 HYPERCHOLESTEROLEMIA: Status: ACTIVE | Noted: 2021-03-24

## 2023-05-08 PROBLEM — D25.9 UTERINE FIBROID: Status: ACTIVE | Noted: 2023-05-08

## 2023-05-08 PROBLEM — R51.9 HEADACHE: Status: ACTIVE | Noted: 2023-05-08

## 2023-05-08 PROBLEM — H25.13 NUCLEAR SCLEROSIS OF BOTH EYES: Status: ACTIVE | Noted: 2023-05-08

## 2023-05-08 PROBLEM — R73.03 PREDIABETES: Status: ACTIVE | Noted: 2019-02-18

## 2023-05-08 PROBLEM — R11.0 NAUSEA IN ADULT: Status: ACTIVE | Noted: 2023-05-08

## 2023-05-08 PROBLEM — H21.82 PLATEAU IRIS: Status: ACTIVE | Noted: 2023-05-08

## 2023-05-08 PROBLEM — M15.0 PRIMARY OSTEOARTHRITIS INVOLVING MULTIPLE JOINTS: Status: ACTIVE | Noted: 2019-03-26

## 2023-05-08 PROBLEM — M54.2 NECK PAIN: Status: ACTIVE | Noted: 2023-05-08

## 2023-05-08 PROBLEM — M19.90 OSTEOARTHRITIS: Status: ACTIVE | Noted: 2023-05-08

## 2023-05-08 PROBLEM — H40.20X0 ANGLE-CLOSURE GLAUCOMA: Status: ACTIVE | Noted: 2023-05-08

## 2023-05-08 PROBLEM — R92.30 DENSE BREAST TISSUE ON MAMMOGRAM: Status: ACTIVE | Noted: 2021-03-24

## 2023-05-08 PROBLEM — R91.1 LUNG NODULE: Status: ACTIVE | Noted: 2021-05-19

## 2023-05-08 PROCEDURE — 3077F SYST BP >= 140 MM HG: CPT | Performed by: FAMILY MEDICINE

## 2023-05-08 PROCEDURE — 99214 OFFICE O/P EST MOD 30 MIN: CPT | Performed by: FAMILY MEDICINE

## 2023-05-08 PROCEDURE — 3080F DIAST BP >= 90 MM HG: CPT | Performed by: FAMILY MEDICINE

## 2023-05-08 RX ORDER — ETANERCEPT 50 MG/ML
SOLUTION SUBCUTANEOUS
COMMUNITY
Start: 2022-07-19 | End: 2023-05-08 | Stop reason: ALTCHOICE

## 2023-05-08 RX ORDER — VENLAFAXINE HYDROCHLORIDE 75 MG/1
75 CAPSULE, EXTENDED RELEASE ORAL EVERY 24 HOURS
Qty: 90 CAPSULE | Refills: 3 | Status: SHIPPED | OUTPATIENT
Start: 2023-05-08 | End: 2024-04-22

## 2023-05-08 RX ORDER — IBUPROFEN 200 MG
TABLET ORAL
COMMUNITY

## 2023-05-08 RX ORDER — TIZANIDINE HYDROCHLORIDE 4 MG/1
1 CAPSULE, GELATIN COATED ORAL 3 TIMES DAILY
COMMUNITY
Start: 2022-07-19 | End: 2023-05-08 | Stop reason: ALTCHOICE

## 2023-05-08 RX ORDER — LUTEIN 6 MG
1 TABLET ORAL DAILY
COMMUNITY
Start: 2021-11-22

## 2023-05-08 RX ORDER — ONDANSETRON 4 MG/1
4 TABLET, FILM COATED ORAL EVERY 8 HOURS PRN
COMMUNITY
Start: 2021-05-11 | End: 2023-05-08 | Stop reason: ALTCHOICE

## 2023-05-08 RX ORDER — MELOXICAM 15 MG/1
15 TABLET ORAL
COMMUNITY
Start: 2021-03-24 | End: 2023-05-08 | Stop reason: ALTCHOICE

## 2023-05-08 RX ORDER — VENLAFAXINE HYDROCHLORIDE 75 MG/1
CAPSULE, EXTENDED RELEASE ORAL EVERY 24 HOURS
COMMUNITY
Start: 2021-03-24 | End: 2023-05-08 | Stop reason: SDUPTHER

## 2023-05-08 RX ORDER — LISINOPRIL 10 MG/1
TABLET ORAL EVERY 24 HOURS
COMMUNITY
Start: 2021-11-22 | End: 2023-05-08 | Stop reason: SDUPTHER

## 2023-05-08 RX ORDER — CYCLOBENZAPRINE HCL 10 MG
TABLET ORAL EVERY 8 HOURS
COMMUNITY
Start: 2022-10-21 | End: 2023-05-08 | Stop reason: SDUPTHER

## 2023-05-08 RX ORDER — LISINOPRIL 10 MG/1
10 TABLET ORAL DAILY
Qty: 90 TABLET | Refills: 3 | Status: SHIPPED | OUTPATIENT
Start: 2023-05-08 | End: 2024-04-22

## 2023-05-08 RX ORDER — PREGABALIN 100 MG/1
1 CAPSULE ORAL EVERY 12 HOURS
COMMUNITY
Start: 2022-05-18 | End: 2023-05-08 | Stop reason: ALTCHOICE

## 2023-05-08 RX ORDER — CYCLOBENZAPRINE HCL 10 MG
10 TABLET ORAL EVERY 8 HOURS
Qty: 90 TABLET | Refills: 3 | Status: SHIPPED | OUTPATIENT
Start: 2023-05-08 | End: 2023-10-12

## 2023-05-08 RX ORDER — FERROUS SULFATE 325(65) MG
1 TABLET ORAL DAILY
COMMUNITY
Start: 2021-11-22 | End: 2023-05-08 | Stop reason: ALTCHOICE

## 2023-05-08 ASSESSMENT — ENCOUNTER SYMPTOMS
NERVOUS/ANXIOUS: 0
MYALGIAS: 0
NAUSEA: 0
SHORTNESS OF BREATH: 0
BRUISES/BLEEDS EASILY: 0
NECK PAIN: 0
DIZZINESS: 0
ABDOMINAL PAIN: 0
BACK PAIN: 0
FATIGUE: 0
WEAKNESS: 0
CONSTIPATION: 0
ADENOPATHY: 0
SORE THROAT: 0
BLOOD IN STOOL: 0
DYSPHORIC MOOD: 0
NUMBNESS: 0
VOMITING: 0
COUGH: 0
DIFFICULTY URINATING: 0
ACTIVITY CHANGE: 0
HEMATURIA: 0
ARTHRALGIAS: 0
EYE DISCHARGE: 0
HEADACHES: 0
DIARRHEA: 0
CHEST TIGHTNESS: 0

## 2023-05-08 NOTE — PROGRESS NOTES
"Subjective   Patient ID: Stella Dunn is a 52 y.o. female who presents for follow up on Anxiety, Depression, and Hypertension.    Would like to restart medications.     Complains of full body soreness.     Discuss yearly rash.     HPI  Rheumatoid arthritis stable  Generalized arthritis stable  Keep follow-up with rheumatology    Asthma stable  No chest pain or shortness of breath    High cholesterol stable  Watch blood pressure    Fibromyalgia stable  Multiple areas affected    Positive tinea versicolor changes  Would benefit from treatment  Review of Systems   Constitutional:  Negative for activity change and fatigue.   HENT:  Negative for congestion and sore throat.    Eyes:  Negative for discharge.   Respiratory:  Negative for cough, chest tightness and shortness of breath.    Cardiovascular:  Negative for chest pain and leg swelling.   Gastrointestinal:  Negative for abdominal pain, blood in stool, constipation, diarrhea, nausea and vomiting.   Endocrine: Negative for cold intolerance and heat intolerance.   Genitourinary:  Negative for difficulty urinating and hematuria.   Musculoskeletal:  Negative for arthralgias, back pain, gait problem, myalgias and neck pain.   Skin:  Positive for rash.   Allergic/Immunologic: Negative for environmental allergies.   Neurological:  Negative for dizziness, syncope, weakness, numbness and headaches.   Hematological:  Negative for adenopathy. Does not bruise/bleed easily.   Psychiatric/Behavioral:  Negative for dysphoric mood. The patient is not nervous/anxious.    All other systems reviewed and are negative.      Objective   BP (!) 150/92 (BP Location: Left arm, BP Cuff Size: Large adult)   Pulse 61   Temp 36.3 °C (97.3 °F)   Ht 1.702 m (5' 7\")   Wt 73.2 kg (161 lb 6.4 oz)   LMP  (LMP Unknown) Comment: post menopausal  SpO2 91%   BMI 25.28 kg/m²    Physical Exam  Vitals and nursing note reviewed.   Constitutional:       General: She is not in acute distress.     " Appearance: Normal appearance.   HENT:      Head: Normocephalic and atraumatic.      Right Ear: Tympanic membrane, ear canal and external ear normal.      Left Ear: Tympanic membrane, ear canal and external ear normal.      Nose: Nose normal.      Mouth/Throat:      Mouth: Mucous membranes are moist.      Pharynx: Oropharynx is clear. No oropharyngeal exudate or posterior oropharyngeal erythema.   Eyes:      Extraocular Movements: Extraocular movements intact.      Conjunctiva/sclera: Conjunctivae normal.      Pupils: Pupils are equal, round, and reactive to light.   Cardiovascular:      Rate and Rhythm: Normal rate and regular rhythm.      Pulses: Normal pulses.      Heart sounds: Normal heart sounds. No murmur heard.  Pulmonary:      Effort: Pulmonary effort is normal. No respiratory distress.      Breath sounds: Normal breath sounds. No wheezing or rales.   Abdominal:      General: Abdomen is flat. Bowel sounds are normal. There is no distension.      Palpations: Abdomen is soft. There is no mass.      Tenderness: There is no abdominal tenderness.   Musculoskeletal:         General: No swelling or deformity. Normal range of motion.      Cervical back: Normal range of motion and neck supple.      Right lower leg: No edema.      Left lower leg: No edema.   Lymphadenopathy:      Cervical: No cervical adenopathy.   Skin:     General: Skin is warm and dry.      Capillary Refill: Capillary refill takes less than 2 seconds.      Findings: No lesion or rash.      Comments: Tinea versicolor changes of the trunk   Neurological:      General: No focal deficit present.      Mental Status: She is alert and oriented to person, place, and time.      Cranial Nerves: No cranial nerve deficit.      Motor: No weakness.   Psychiatric:         Mood and Affect: Mood normal.         Behavior: Behavior normal.         Thought Content: Thought content normal.         Judgment: Judgment normal.         Assessment/Plan   Problem List Items  Addressed This Visit          Respiratory    Mild persistent asthma without complication - Primary       Circulatory    Primary hypertension    Relevant Medications    lisinopril 10 mg tablet    Other Relevant Orders    Follow Up In [facility] - PCP       Musculoskeletal    Fibromyalgia    Primary osteoarthritis involving multiple joints       Infectious/Inflammatory    Tinea versicolor    Relevant Medications    ketoconazole 1 % shampoo       Other    Anxiety and depression    Relevant Medications    venlafaxine XR (Effexor-XR) 75 mg 24 hr capsule    Rheumatoid arthritis flare (CMS/HCC)    Relevant Medications    cyclobenzaprine (Flexeril) 10 mg tablet       Patient education provided.  Stay current with age appropriate health maintenance as instructed.  Appointment here or ER with new or worsening symptoms'  Keep appropriate follow-up visit.  Stay current with proper immunizations   Recheck 3 months and as needed    Report suicidal ideation report psychotic or manic symptoms

## 2023-08-08 ENCOUNTER — OFFICE VISIT (OUTPATIENT)
Dept: PRIMARY CARE | Facility: CLINIC | Age: 53
End: 2023-08-08
Payer: COMMERCIAL

## 2023-08-08 VITALS
DIASTOLIC BLOOD PRESSURE: 84 MMHG | BODY MASS INDEX: 24.61 KG/M2 | OXYGEN SATURATION: 99 % | HEIGHT: 67 IN | WEIGHT: 156.8 LBS | TEMPERATURE: 97.4 F | HEART RATE: 111 BPM | SYSTOLIC BLOOD PRESSURE: 124 MMHG

## 2023-08-08 DIAGNOSIS — I10 PRIMARY HYPERTENSION: ICD-10-CM

## 2023-08-08 DIAGNOSIS — E55.9 VITAMIN D DEFICIENCY: ICD-10-CM

## 2023-08-08 DIAGNOSIS — F33.0 MILD EPISODE OF RECURRENT MAJOR DEPRESSIVE DISORDER (CMS-HCC): ICD-10-CM

## 2023-08-08 DIAGNOSIS — M15.9 PRIMARY OSTEOARTHRITIS INVOLVING MULTIPLE JOINTS: ICD-10-CM

## 2023-08-08 DIAGNOSIS — R00.2 PALPITATIONS: Primary | ICD-10-CM

## 2023-08-08 DIAGNOSIS — M79.7 FIBROMYALGIA: ICD-10-CM

## 2023-08-08 PROCEDURE — 3079F DIAST BP 80-89 MM HG: CPT | Performed by: FAMILY MEDICINE

## 2023-08-08 PROCEDURE — 3074F SYST BP LT 130 MM HG: CPT | Performed by: FAMILY MEDICINE

## 2023-08-08 PROCEDURE — 99214 OFFICE O/P EST MOD 30 MIN: CPT | Performed by: FAMILY MEDICINE

## 2023-08-08 RX ORDER — GABAPENTIN 100 MG/1
100 CAPSULE ORAL 3 TIMES DAILY
Qty: 90 CAPSULE | Refills: 5 | Status: SHIPPED | OUTPATIENT
Start: 2023-08-08 | End: 2024-02-06 | Stop reason: ALTCHOICE

## 2023-08-08 ASSESSMENT — ENCOUNTER SYMPTOMS
FATIGUE: 0
ADENOPATHY: 0
SHORTNESS OF BREATH: 0
MYALGIAS: 0
DIARRHEA: 0
BRUISES/BLEEDS EASILY: 0
CHEST TIGHTNESS: 0
COUGH: 0
BLOOD IN STOOL: 0
DIFFICULTY URINATING: 0
HEADACHES: 0
ABDOMINAL PAIN: 0
EYE DISCHARGE: 0
NERVOUS/ANXIOUS: 0
WEAKNESS: 0
BACK PAIN: 0
NAUSEA: 0
NUMBNESS: 0
ACTIVITY CHANGE: 0
VOMITING: 0
DYSPHORIC MOOD: 0
CONSTIPATION: 0
SORE THROAT: 0
ARTHRALGIAS: 0
DIZZINESS: 0
HEMATURIA: 0
NECK PAIN: 0

## 2023-08-08 NOTE — PROGRESS NOTES
"Subjective   Patient ID: Stella Dunn is a 52 y.o. female who presents for 3 month follow up on Anxiety, Depression, and Hypertension.    Discuss fibromyalgia and arthritis flaring up.     HPI  Aggravation of fibromyalgia and arthritis  Discussed treatment options  We will try medication treatment  She declines further testing at this time    Depression stable  No suicidal ideation no psychotic or manic symptoms    Hypertension stable  No chest pain or shortness of breath    Positive palpitations  No syncope  No further worsening    Low vitamin D  Replace and follow blood work  Review of Systems   Constitutional:  Negative for activity change and fatigue.   HENT:  Negative for congestion and sore throat.    Eyes:  Negative for discharge.   Respiratory:  Negative for cough, chest tightness and shortness of breath.    Cardiovascular:  Negative for chest pain and leg swelling.   Gastrointestinal:  Negative for abdominal pain, blood in stool, constipation, diarrhea, nausea and vomiting.   Endocrine: Negative for cold intolerance and heat intolerance.   Genitourinary:  Negative for difficulty urinating and hematuria.   Musculoskeletal:  Negative for arthralgias, back pain, gait problem, myalgias and neck pain.   Allergic/Immunologic: Negative for environmental allergies.   Neurological:  Negative for dizziness, syncope, weakness, numbness and headaches.   Hematological:  Negative for adenopathy. Does not bruise/bleed easily.   Psychiatric/Behavioral:  Negative for dysphoric mood. The patient is not nervous/anxious.    All other systems reviewed and are negative.      Objective   /84 (BP Location: Left arm, BP Cuff Size: Large adult)   Pulse (!) 111   Temp 36.3 °C (97.4 °F)   Ht 1.702 m (5' 7\")   Wt 71.1 kg (156 lb 12.8 oz)   SpO2 99%   BMI 24.56 kg/m²    Physical Exam  Vitals and nursing note reviewed.   Constitutional:       General: She is not in acute distress.     Appearance: Normal appearance. "   HENT:      Head: Normocephalic and atraumatic.      Right Ear: Tympanic membrane, ear canal and external ear normal.      Left Ear: Tympanic membrane, ear canal and external ear normal.      Nose: Nose normal.      Mouth/Throat:      Mouth: Mucous membranes are moist.      Pharynx: Oropharynx is clear. No oropharyngeal exudate or posterior oropharyngeal erythema.   Eyes:      Extraocular Movements: Extraocular movements intact.      Conjunctiva/sclera: Conjunctivae normal.      Pupils: Pupils are equal, round, and reactive to light.   Cardiovascular:      Rate and Rhythm: Normal rate and regular rhythm.      Pulses: Normal pulses.      Heart sounds: Normal heart sounds. No murmur heard.  Pulmonary:      Effort: Pulmonary effort is normal. No respiratory distress.      Breath sounds: Normal breath sounds. No wheezing or rales.   Abdominal:      General: Abdomen is flat. Bowel sounds are normal. There is no distension.      Palpations: Abdomen is soft. There is no mass.      Tenderness: There is no abdominal tenderness.   Musculoskeletal:         General: No swelling or deformity. Normal range of motion.      Cervical back: Normal range of motion and neck supple.      Right lower leg: No edema.      Left lower leg: No edema.   Lymphadenopathy:      Cervical: No cervical adenopathy.   Skin:     General: Skin is warm and dry.      Capillary Refill: Capillary refill takes less than 2 seconds.      Findings: No lesion or rash.   Neurological:      General: No focal deficit present.      Mental Status: She is alert and oriented to person, place, and time.      Cranial Nerves: No cranial nerve deficit.      Motor: No weakness.   Psychiatric:         Mood and Affect: Mood normal.         Behavior: Behavior normal.         Thought Content: Thought content normal.         Judgment: Judgment normal.     Multiple fibromyalgia tender points noted    Assessment/Plan   Problem List Items Addressed This Visit       Primary  hypertension    Mild episode of recurrent major depressive disorder (CMS/HCC)    Fibromyalgia    Relevant Medications    gabapentin (Neurontin) 100 mg capsule    Other Relevant Orders    Follow Up In [facility] - PCP    Palpitations - Primary    Primary osteoarthritis involving multiple joints    Vitamin D deficiency       Patient education provided.  Stay current with age appropriate health maintenance as instructed.  Appointment here or ER with new or worsening symptoms'  Keep appropriate follow-up visit.  Stay current with proper immunizations   Testing as above recheck 3 months and as needed  Return with new or worsening symptoms

## 2023-10-11 DIAGNOSIS — M06.9 RHEUMATOID ARTHRITIS FLARE (MULTI): ICD-10-CM

## 2023-10-11 NOTE — TELEPHONE ENCOUNTER
Rx Refill Request     Name: Stella Dunn  :  1970   Medication Name:  cyclobenzaprine (Flexeril) 10 mg tablet   Specific Pharmacy location:  [facility]/pharmacy #[id number] [place], [place]   Date of last appointment:  2023   Date of next appointment:  2023   Best number to reach patient:  [phone]

## 2023-10-12 RX ORDER — CYCLOBENZAPRINE HCL 10 MG
10 TABLET ORAL EVERY 8 HOURS
Qty: 90 TABLET | Refills: 0 | Status: SHIPPED | OUTPATIENT
Start: 2023-10-12 | End: 2023-11-22

## 2023-11-03 NOTE — PROGRESS NOTES
"Subjective   Patient ID: Stella Dunn is a 53 y.o. female who presents for Hypertension and Fibromyalgia.  HPI  Fibromyalgia stable  No progression or worsening of symptoms    Hypertension good control  No chest pain or shortness of breath    Vitamin D stable  Replace    Rheumatoid stable  Keep in rheumatology follow-up    Asthma stable  No cough or wheeze    Patient smokes and must quit    Positive hyperglycemia  Watch diet  Check blood work  Review of Systems   Constitutional:  Negative for activity change and fatigue.   HENT:  Negative for congestion and sore throat.    Eyes:  Negative for discharge.   Respiratory:  Negative for cough, chest tightness and shortness of breath.    Cardiovascular:  Negative for chest pain and leg swelling.   Gastrointestinal:  Negative for abdominal pain, blood in stool, constipation, diarrhea, nausea and vomiting.   Endocrine: Negative for cold intolerance and heat intolerance.   Genitourinary:  Negative for difficulty urinating and hematuria.   Musculoskeletal:  Negative for arthralgias, back pain, gait problem, myalgias and neck pain.   Allergic/Immunologic: Negative for environmental allergies.   Neurological:  Negative for dizziness, syncope, weakness, numbness and headaches.   Hematological:  Negative for adenopathy. Does not bruise/bleed easily.   Psychiatric/Behavioral:  Negative for dysphoric mood. The patient is not nervous/anxious.    All other systems reviewed and are negative.      Objective   /76 (BP Location: Left arm, BP Cuff Size: Adult)   Pulse 99   Temp 36.3 °C (97.3 °F) (Temporal)   Ht 1.702 m (5' 7\")   Wt 72.8 kg (160 lb 9.6 oz)   SpO2 99%   BMI 25.15 kg/m²    Physical Exam  Vitals and nursing note reviewed.   Constitutional:       General: She is not in acute distress.     Appearance: Normal appearance.   HENT:      Head: Normocephalic and atraumatic.      Right Ear: Tympanic membrane, ear canal and external ear normal.      Left Ear: Tympanic " membrane, ear canal and external ear normal.      Nose: Nose normal.      Mouth/Throat:      Mouth: Mucous membranes are moist.      Pharynx: Oropharynx is clear. No oropharyngeal exudate or posterior oropharyngeal erythema.   Eyes:      Extraocular Movements: Extraocular movements intact.      Conjunctiva/sclera: Conjunctivae normal.      Pupils: Pupils are equal, round, and reactive to light.   Cardiovascular:      Rate and Rhythm: Normal rate and regular rhythm.      Pulses: Normal pulses.      Heart sounds: Normal heart sounds. No murmur heard.  Pulmonary:      Effort: Pulmonary effort is normal. No respiratory distress.      Breath sounds: Normal breath sounds. No wheezing or rales.   Abdominal:      General: Abdomen is flat. Bowel sounds are normal. There is no distension.      Palpations: Abdomen is soft. There is no mass.      Tenderness: There is no abdominal tenderness.   Musculoskeletal:         General: No swelling or deformity. Normal range of motion.      Cervical back: Normal range of motion and neck supple.      Right lower leg: No edema.      Left lower leg: No edema.   Lymphadenopathy:      Cervical: No cervical adenopathy.   Skin:     General: Skin is warm and dry.      Capillary Refill: Capillary refill takes less than 2 seconds.      Findings: No lesion or rash.   Neurological:      General: No focal deficit present.      Mental Status: She is alert and oriented to person, place, and time.      Cranial Nerves: No cranial nerve deficit.      Motor: No weakness.   Psychiatric:         Mood and Affect: Mood normal.         Behavior: Behavior normal.         Thought Content: Thought content normal.         Judgment: Judgment normal.         Assessment/Plan   Problem List Items Addressed This Visit       Current smoker    Primary hypertension - Primary    Relevant Orders    Comprehensive Metabolic Panel    CBC and Auto Differential (Completed)    Lipid Panel    Mild persistent asthma without  complication    Fibromyalgia    Relevant Orders    Follow Up In [facility] - PCP    Rheumatoid arthritis flare (CMS/HCC)    Vitamin D deficiency     Other Visit Diagnoses       Hyperglycemia        Relevant Orders    Hemoglobin A1C            Patient education provided.  Stay current with age appropriate health maintenance as instructed.  Appointment here or ER with new or worsening symptoms'  Keep appropriate follow-up visit.  Stay current with proper immunizations       Recheck 3 months and as needed  Keep specialist follow-up  Report new or worsening symptoms

## 2023-11-06 ENCOUNTER — LAB (OUTPATIENT)
Dept: LAB | Facility: LAB | Age: 53
End: 2023-11-06
Payer: COMMERCIAL

## 2023-11-06 ENCOUNTER — OFFICE VISIT (OUTPATIENT)
Dept: PRIMARY CARE | Facility: CLINIC | Age: 53
End: 2023-11-06
Payer: COMMERCIAL

## 2023-11-06 VITALS
WEIGHT: 160.6 LBS | DIASTOLIC BLOOD PRESSURE: 76 MMHG | OXYGEN SATURATION: 99 % | HEART RATE: 99 BPM | BODY MASS INDEX: 25.21 KG/M2 | TEMPERATURE: 97.3 F | SYSTOLIC BLOOD PRESSURE: 132 MMHG | HEIGHT: 67 IN

## 2023-11-06 DIAGNOSIS — M79.7 FIBROMYALGIA: ICD-10-CM

## 2023-11-06 DIAGNOSIS — E55.9 VITAMIN D DEFICIENCY: ICD-10-CM

## 2023-11-06 DIAGNOSIS — I10 PRIMARY HYPERTENSION: Primary | ICD-10-CM

## 2023-11-06 DIAGNOSIS — R73.9 HYPERGLYCEMIA: ICD-10-CM

## 2023-11-06 DIAGNOSIS — M06.9 RHEUMATOID ARTHRITIS FLARE (MULTI): ICD-10-CM

## 2023-11-06 DIAGNOSIS — I10 PRIMARY HYPERTENSION: ICD-10-CM

## 2023-11-06 DIAGNOSIS — F17.200 CURRENT SMOKER: ICD-10-CM

## 2023-11-06 DIAGNOSIS — J45.30 MILD PERSISTENT ASTHMA WITHOUT COMPLICATION (HHS-HCC): ICD-10-CM

## 2023-11-06 LAB
ALBUMIN SERPL BCP-MCNC: 4.1 G/DL (ref 3.4–5)
ALP SERPL-CCNC: 63 U/L (ref 33–110)
ALT SERPL W P-5'-P-CCNC: 19 U/L (ref 7–45)
ANION GAP SERPL CALC-SCNC: 8 MMOL/L (ref 10–20)
AST SERPL W P-5'-P-CCNC: 17 U/L (ref 9–39)
BASOPHILS # BLD AUTO: 0.08 X10*3/UL (ref 0–0.1)
BASOPHILS NFR BLD AUTO: 0.9 %
BILIRUB SERPL-MCNC: 0.3 MG/DL (ref 0–1.2)
BUN SERPL-MCNC: 13 MG/DL (ref 6–23)
CALCIUM SERPL-MCNC: 9.3 MG/DL (ref 8.6–10.3)
CHLORIDE SERPL-SCNC: 103 MMOL/L (ref 98–107)
CHOLEST SERPL-MCNC: 175 MG/DL (ref 0–199)
CHOLESTEROL/HDL RATIO: 3.8
CO2 SERPL-SCNC: 31 MMOL/L (ref 21–32)
CREAT SERPL-MCNC: 0.94 MG/DL (ref 0.5–1.05)
EOSINOPHIL # BLD AUTO: 0.49 X10*3/UL (ref 0–0.7)
EOSINOPHIL NFR BLD AUTO: 5.6 %
ERYTHROCYTE [DISTWIDTH] IN BLOOD BY AUTOMATED COUNT: 13 % (ref 11.5–14.5)
EST. AVERAGE GLUCOSE BLD GHB EST-MCNC: 117 MG/DL
GFR SERPL CREATININE-BSD FRML MDRD: 73 ML/MIN/1.73M*2
GLUCOSE SERPL-MCNC: 100 MG/DL (ref 74–99)
HBA1C MFR BLD: 5.7 %
HCT VFR BLD AUTO: 44 % (ref 36–46)
HDLC SERPL-MCNC: 45.6 MG/DL
HGB BLD-MCNC: 14.6 G/DL (ref 12–16)
IMM GRANULOCYTES # BLD AUTO: 0.02 X10*3/UL (ref 0–0.7)
IMM GRANULOCYTES NFR BLD AUTO: 0.2 % (ref 0–0.9)
LDLC SERPL CALC-MCNC: 109 MG/DL
LYMPHOCYTES # BLD AUTO: 3.08 X10*3/UL (ref 1.2–4.8)
LYMPHOCYTES NFR BLD AUTO: 35.5 %
MCH RBC QN AUTO: 30.5 PG (ref 26–34)
MCHC RBC AUTO-ENTMCNC: 33.2 G/DL (ref 32–36)
MCV RBC AUTO: 92 FL (ref 80–100)
MONOCYTES # BLD AUTO: 0.67 X10*3/UL (ref 0.1–1)
MONOCYTES NFR BLD AUTO: 7.7 %
NEUTROPHILS # BLD AUTO: 4.34 X10*3/UL (ref 1.2–7.7)
NEUTROPHILS NFR BLD AUTO: 50.1 %
NON HDL CHOLESTEROL: 129 MG/DL (ref 0–149)
NRBC BLD-RTO: 0 /100 WBCS (ref 0–0)
PLATELET # BLD AUTO: 378 X10*3/UL (ref 150–450)
POTASSIUM SERPL-SCNC: 4.4 MMOL/L (ref 3.5–5.3)
PROT SERPL-MCNC: 6.7 G/DL (ref 6.4–8.2)
RBC # BLD AUTO: 4.78 X10*6/UL (ref 4–5.2)
SODIUM SERPL-SCNC: 138 MMOL/L (ref 136–145)
TRIGL SERPL-MCNC: 104 MG/DL (ref 0–149)
VLDL: 21 MG/DL (ref 0–40)
WBC # BLD AUTO: 8.7 X10*3/UL (ref 4.4–11.3)

## 2023-11-06 PROCEDURE — 90686 IIV4 VACC NO PRSV 0.5 ML IM: CPT | Performed by: FAMILY MEDICINE

## 2023-11-06 PROCEDURE — 83036 HEMOGLOBIN GLYCOSYLATED A1C: CPT

## 2023-11-06 PROCEDURE — 3078F DIAST BP <80 MM HG: CPT | Performed by: FAMILY MEDICINE

## 2023-11-06 PROCEDURE — 36415 COLL VENOUS BLD VENIPUNCTURE: CPT

## 2023-11-06 PROCEDURE — 85025 COMPLETE CBC W/AUTO DIFF WBC: CPT

## 2023-11-06 PROCEDURE — 80053 COMPREHEN METABOLIC PANEL: CPT

## 2023-11-06 PROCEDURE — 80061 LIPID PANEL: CPT

## 2023-11-06 PROCEDURE — 99214 OFFICE O/P EST MOD 30 MIN: CPT | Performed by: FAMILY MEDICINE

## 2023-11-06 PROCEDURE — 3075F SYST BP GE 130 - 139MM HG: CPT | Performed by: FAMILY MEDICINE

## 2023-11-06 PROCEDURE — 90471 IMMUNIZATION ADMIN: CPT | Performed by: FAMILY MEDICINE

## 2023-11-06 ASSESSMENT — ENCOUNTER SYMPTOMS
VOMITING: 0
HEADACHES: 0
EYE DISCHARGE: 0
COUGH: 0
ARTHRALGIAS: 0
ADENOPATHY: 0
DYSPHORIC MOOD: 0
BRUISES/BLEEDS EASILY: 0
CONSTIPATION: 0
DIZZINESS: 0
DIFFICULTY URINATING: 0
WEAKNESS: 0
NERVOUS/ANXIOUS: 0
CHEST TIGHTNESS: 0
HEMATURIA: 0
SORE THROAT: 0
NECK PAIN: 0
NAUSEA: 0
FATIGUE: 0
DIARRHEA: 0
BLOOD IN STOOL: 0
ACTIVITY CHANGE: 0
SHORTNESS OF BREATH: 0
NUMBNESS: 0
BACK PAIN: 0
ABDOMINAL PAIN: 0
MYALGIAS: 0

## 2023-11-22 DIAGNOSIS — M06.9 RHEUMATOID ARTHRITIS FLARE (MULTI): ICD-10-CM

## 2023-11-22 RX ORDER — CYCLOBENZAPRINE HCL 10 MG
10 TABLET ORAL EVERY 8 HOURS
Qty: 90 TABLET | Refills: 0 | Status: SHIPPED | OUTPATIENT
Start: 2023-11-22 | End: 2023-12-29

## 2023-11-22 NOTE — TELEPHONE ENCOUNTER
Rx Refill Request     Name: Stella Dnun  :  1970   Medication Name:  cyclobenzaprine (Flexeril) 10 mg tablet   Specific Pharmacy location:  [facility]/pharmacy #[id number] [place], [place]   Date of last appointment:  2023   Date of next appointment:  2024   Best number to reach patient:  [phone]

## 2023-12-29 DIAGNOSIS — M06.9 RHEUMATOID ARTHRITIS FLARE (MULTI): ICD-10-CM

## 2023-12-29 RX ORDER — CYCLOBENZAPRINE HCL 10 MG
10 TABLET ORAL EVERY 8 HOURS
Qty: 90 TABLET | Refills: 0 | Status: SHIPPED | OUTPATIENT
Start: 2023-12-29 | End: 2024-01-30

## 2023-12-29 NOTE — TELEPHONE ENCOUNTER
Recent Visits  Date Type Provider Dept   11/06/23 Office Visit Robby Wright MD Do [id number] [facility]   08/08/23 Office Visit Robby Wright MD Do [id number] [facility]   05/08/23 Office Visit Robby Wright MD Do [id number] [facility]   Showing recent visits within past 540 days and meeting all other requirements  Future Appointments  Date Type Provider Dept   02/06/24 Appointment Robby Wright MD Do [id number] [facility]   Showing future appointments within next 180 days and meeting all other requirements

## 2024-01-26 DIAGNOSIS — M06.9 RHEUMATOID ARTHRITIS FLARE (MULTI): ICD-10-CM

## 2024-01-30 RX ORDER — CYCLOBENZAPRINE HCL 10 MG
10 TABLET ORAL EVERY 8 HOURS
Qty: 90 TABLET | Refills: 0 | Status: SHIPPED | OUTPATIENT
Start: 2024-01-30 | End: 2024-03-01

## 2024-01-30 NOTE — TELEPHONE ENCOUNTER
Rx Refill Request     Name: Stella Dunn  :  1970   Medication Name:  cyclobenzaprine (Flexeril) 10 mg tablet   Specific Pharmacy location:  [facility]/pharmacy #[id number] [place], [place]   Date of last appointment:  2023   Date of next appointment:  2024   Best number to reach patient:  [phone]

## 2024-02-01 NOTE — PROGRESS NOTES
Subjective   Patient ID: Stella Dunn is a 53 y.o. female who presents for Hypertension and Sore Throat.  Sore Throat   This is a recurrent problem. The current episode started today. The problem has been unchanged. The pain is worse on the left side. There has been no fever. The pain is at a severity of 3/10. Associated symptoms include swollen glands. Pertinent negatives include no abdominal pain, congestion, coughing, diarrhea, drooling, ear discharge, ear pain, headaches, hoarse voice, plugged ear sensation, neck pain, shortness of breath, stridor, trouble swallowing or vomiting. She has had no exposure to strep or mono.   He declines testing would like medicine    Fibromyalgia ongoing  No progression of symptoms    Hypertension good control no chest pain or shortness of breath    Low vitamin D recommend replace    Asthma stable  No cough or wheeze or shortness of breath    Positive tobacco use  Recommend stop smoking  Discussed risks    Review of Systems   Constitutional:  Negative for activity change and fatigue.   HENT:  Positive for sore throat. Negative for congestion, drooling, ear discharge, ear pain, hoarse voice and trouble swallowing.    Eyes:  Negative for discharge.   Respiratory:  Negative for cough, chest tightness, shortness of breath and stridor.    Cardiovascular:  Negative for chest pain and leg swelling.   Gastrointestinal:  Negative for abdominal pain, blood in stool, constipation, diarrhea, nausea and vomiting.   Endocrine: Negative for cold intolerance and heat intolerance.   Genitourinary:  Negative for difficulty urinating and hematuria.   Musculoskeletal:  Negative for arthralgias, back pain, gait problem, myalgias and neck pain.   Allergic/Immunologic: Negative for environmental allergies.   Neurological:  Negative for dizziness, syncope, weakness, numbness and headaches.   Hematological:  Negative for adenopathy. Does not bruise/bleed easily.   Psychiatric/Behavioral:  Negative  "for dysphoric mood. The patient is not nervous/anxious.    All other systems reviewed and are negative.      Objective   /84 (BP Location: Right arm, BP Cuff Size: Adult)   Pulse (!) 122   Temp 36.6 °C (97.9 °F) (Temporal)   Ht 1.702 m (5' 7\")   Wt 73.9 kg (163 lb)   SpO2 98%   BMI 25.53 kg/m²    Physical Exam  Vitals and nursing note reviewed.   Constitutional:       General: She is not in acute distress.     Appearance: Normal appearance.   HENT:      Head: Normocephalic and atraumatic.      Right Ear: Tympanic membrane, ear canal and external ear normal.      Left Ear: Tympanic membrane, ear canal and external ear normal.      Nose: Nose normal.      Mouth/Throat:      Mouth: Mucous membranes are moist.      Pharynx: Oropharynx is clear. Posterior oropharyngeal erythema present. No oropharyngeal exudate.   Eyes:      Extraocular Movements: Extraocular movements intact.      Conjunctiva/sclera: Conjunctivae normal.      Pupils: Pupils are equal, round, and reactive to light.   Cardiovascular:      Rate and Rhythm: Normal rate and regular rhythm.      Pulses: Normal pulses.      Heart sounds: Normal heart sounds. No murmur heard.  Pulmonary:      Effort: Pulmonary effort is normal. No respiratory distress.      Breath sounds: Normal breath sounds. No wheezing or rales.   Abdominal:      General: Abdomen is flat. Bowel sounds are normal. There is no distension.      Palpations: Abdomen is soft. There is no mass.      Tenderness: There is no abdominal tenderness.   Musculoskeletal:         General: No swelling or deformity. Normal range of motion.      Cervical back: Normal range of motion and neck supple.      Right lower leg: No edema.      Left lower leg: No edema.   Lymphadenopathy:      Cervical: No cervical adenopathy.   Skin:     General: Skin is warm and dry.      Capillary Refill: Capillary refill takes less than 2 seconds.      Findings: No lesion or rash.   Neurological:      General: No focal " deficit present.      Mental Status: She is alert and oriented to person, place, and time.      Cranial Nerves: No cranial nerve deficit.      Motor: No weakness.   Psychiatric:         Mood and Affect: Mood normal.         Behavior: Behavior normal.         Thought Content: Thought content normal.         Judgment: Judgment normal.         Assessment/Plan   Problem List Items Addressed This Visit       Current smoker    Primary hypertension    Mild persistent asthma without complication    Fibromyalgia    Relevant Orders    Follow Up In [facility] - PCP    Vitamin D deficiency     Other Visit Diagnoses       Acute non-recurrent pansinusitis    -  Primary    Relevant Medications    azithromycin (Zithromax) 250 mg tablet            Patient education provided.  Stay current with age appropriate health maintenance as instructed.  Appointment here or ER with new or worsening symptoms'  Keep appropriate follow-up visit.  Stay current with proper immunizations   Medication as above  Recheck 6 months and as needed  Return sooner with new or worsening symptoms  Recommend stop tobacco use

## 2024-02-06 ENCOUNTER — OFFICE VISIT (OUTPATIENT)
Dept: PRIMARY CARE | Facility: CLINIC | Age: 54
End: 2024-02-06
Payer: COMMERCIAL

## 2024-02-06 VITALS
SYSTOLIC BLOOD PRESSURE: 124 MMHG | OXYGEN SATURATION: 98 % | DIASTOLIC BLOOD PRESSURE: 84 MMHG | WEIGHT: 163 LBS | HEIGHT: 67 IN | BODY MASS INDEX: 25.58 KG/M2 | TEMPERATURE: 97.9 F | HEART RATE: 122 BPM

## 2024-02-06 DIAGNOSIS — J45.30 MILD PERSISTENT ASTHMA WITHOUT COMPLICATION (HHS-HCC): ICD-10-CM

## 2024-02-06 DIAGNOSIS — M79.7 FIBROMYALGIA: ICD-10-CM

## 2024-02-06 DIAGNOSIS — F17.200 CURRENT SMOKER: ICD-10-CM

## 2024-02-06 DIAGNOSIS — E55.9 VITAMIN D DEFICIENCY: ICD-10-CM

## 2024-02-06 DIAGNOSIS — J01.40 ACUTE NON-RECURRENT PANSINUSITIS: Primary | ICD-10-CM

## 2024-02-06 DIAGNOSIS — I10 PRIMARY HYPERTENSION: ICD-10-CM

## 2024-02-06 PROCEDURE — 3079F DIAST BP 80-89 MM HG: CPT | Performed by: FAMILY MEDICINE

## 2024-02-06 PROCEDURE — 99214 OFFICE O/P EST MOD 30 MIN: CPT | Performed by: FAMILY MEDICINE

## 2024-02-06 PROCEDURE — 3074F SYST BP LT 130 MM HG: CPT | Performed by: FAMILY MEDICINE

## 2024-02-06 RX ORDER — GABAPENTIN 100 MG/1
100 CAPSULE ORAL 3 TIMES DAILY
COMMUNITY
End: 2024-03-01 | Stop reason: SDUPTHER

## 2024-02-06 RX ORDER — AZITHROMYCIN 250 MG/1
TABLET, FILM COATED ORAL
Qty: 6 TABLET | Refills: 0 | Status: SHIPPED | OUTPATIENT
Start: 2024-02-06 | End: 2024-02-10

## 2024-02-06 ASSESSMENT — ENCOUNTER SYMPTOMS
BLOOD IN STOOL: 0
WEAKNESS: 0
ABDOMINAL PAIN: 0
HEADACHES: 0
FATIGUE: 0
HEMATURIA: 0
DIZZINESS: 0
ADENOPATHY: 0
HOARSE VOICE: 0
NERVOUS/ANXIOUS: 0
DIFFICULTY URINATING: 0
BACK PAIN: 0
DIARRHEA: 0
ARTHRALGIAS: 0
NECK PAIN: 0
ACTIVITY CHANGE: 0
EYE DISCHARGE: 0
MYALGIAS: 0
SHORTNESS OF BREATH: 0
COUGH: 0
CONSTIPATION: 0
NUMBNESS: 0
VOMITING: 0
SORE THROAT: 1
BRUISES/BLEEDS EASILY: 0
DYSPHORIC MOOD: 0
CHEST TIGHTNESS: 0
NAUSEA: 0
TROUBLE SWALLOWING: 0
STRIDOR: 0
SWOLLEN GLANDS: 1

## 2024-03-01 DIAGNOSIS — M06.9 RHEUMATOID ARTHRITIS FLARE (MULTI): Primary | ICD-10-CM

## 2024-03-01 DIAGNOSIS — M79.7 FIBROMYALGIA: Primary | ICD-10-CM

## 2024-03-01 RX ORDER — CYCLOBENZAPRINE HCL 10 MG
10 TABLET ORAL EVERY 8 HOURS
Qty: 90 TABLET | Refills: 0 | Status: SHIPPED | OUTPATIENT
Start: 2024-03-01 | End: 2024-04-05

## 2024-03-01 NOTE — TELEPHONE ENCOUNTER
Recent Visits  Date Type Provider Dept   02/06/24 Office Visit Robby Wright MD Do [id number] [facility]   11/06/23 Office Visit Robby Wright MD Do [id number] [facility]   08/08/23 Office Visit Robby Wright MD Do [id number] [facility]   05/08/23 Office Visit Robby Wright MD Do [id number] [facility]   Showing recent visits within past 540 days and meeting all other requirements  Future Appointments  Date Type Provider Dept   05/06/24 Appointment Robby Wright MD Do [id number] [facility]   Showing future appointments within next 180 days and meeting all other requirements

## 2024-03-01 NOTE — TELEPHONE ENCOUNTER
Rx Refill Request Telephone Encounter    Name:  Stella CORBY Dunn  :  [id number]  Medication Name:    gabapentin (Neurontin) 100 mg capsul     Specific Pharmacy location:  [facility]  Date of last appointment:  24  Date of next appointment:  24  Best number to reach patient:  [phone]

## 2024-03-04 RX ORDER — GABAPENTIN 100 MG/1
100 CAPSULE ORAL 3 TIMES DAILY
Qty: 90 CAPSULE | Refills: 1 | Status: SHIPPED | OUTPATIENT
Start: 2024-03-04 | End: 2024-04-05

## 2024-03-18 NOTE — PROGRESS NOTES
"Subjective   Patient ID: Stella Dunn is a 53 y.o. female who presents for Menopause.  HPI    Been going though menopause and has been having some symptoms and thinks she should be on hormonal replacement       Depressed mood stable  No suicidal ideation no psychotic or manic symptoms    Chronic pain ongoing no progression or worsening  Positive osteoarthritis multiple joints trial medicine  Discussed risks and benefits of hormonal use  3-month recheck      Fibromyalgia stable no changes noted    Tobacco abuse continues  Recommend stop smoking  Discussed risks    Review of Systems   Constitutional:  Negative for activity change and fatigue.   HENT:  Negative for congestion and sore throat.    Eyes:  Negative for discharge.   Respiratory:  Negative for cough, chest tightness and shortness of breath.    Cardiovascular:  Negative for chest pain and leg swelling.   Gastrointestinal:  Negative for abdominal pain, blood in stool, constipation, diarrhea, nausea and vomiting.   Endocrine: Negative for cold intolerance and heat intolerance.   Genitourinary:  Negative for difficulty urinating and hematuria.   Musculoskeletal:  Negative for arthralgias, back pain, gait problem, myalgias and neck pain.   Allergic/Immunologic: Negative for environmental allergies.   Neurological:  Negative for dizziness, syncope, weakness, numbness and headaches.   Hematological:  Negative for adenopathy. Does not bruise/bleed easily.   Psychiatric/Behavioral:  Negative for dysphoric mood. The patient is not nervous/anxious.    All other systems reviewed and are negative.      Objective   /89 (BP Location: Right arm, BP Cuff Size: Adult)   Pulse 108   Ht 1.702 m (5' 7\")   Wt 74.8 kg (165 lb)   SpO2 98%   BMI 25.84 kg/m²    Physical Exam  Vitals and nursing note reviewed.   Constitutional:       General: She is not in acute distress.     Appearance: Normal appearance.   HENT:      Head: Normocephalic and atraumatic.      Right " Ear: Tympanic membrane, ear canal and external ear normal.      Left Ear: Tympanic membrane, ear canal and external ear normal.      Nose: Nose normal.      Mouth/Throat:      Mouth: Mucous membranes are moist.      Pharynx: Oropharynx is clear. No oropharyngeal exudate or posterior oropharyngeal erythema.   Eyes:      Extraocular Movements: Extraocular movements intact.      Conjunctiva/sclera: Conjunctivae normal.      Pupils: Pupils are equal, round, and reactive to light.   Cardiovascular:      Rate and Rhythm: Normal rate and regular rhythm.      Pulses: Normal pulses.      Heart sounds: Normal heart sounds. No murmur heard.  Pulmonary:      Effort: Pulmonary effort is normal. No respiratory distress.      Breath sounds: Normal breath sounds. No wheezing or rales.   Abdominal:      General: Abdomen is flat. Bowel sounds are normal. There is no distension.      Palpations: Abdomen is soft. There is no mass.      Tenderness: There is no abdominal tenderness.   Musculoskeletal:         General: No swelling or deformity. Normal range of motion.      Cervical back: Normal range of motion and neck supple.      Right lower leg: No edema.      Left lower leg: No edema.   Lymphadenopathy:      Cervical: No cervical adenopathy.   Skin:     General: Skin is warm and dry.      Capillary Refill: Capillary refill takes less than 2 seconds.      Findings: No lesion or rash.   Neurological:      General: No focal deficit present.      Mental Status: She is alert and oriented to person, place, and time.      Cranial Nerves: No cranial nerve deficit.      Motor: No weakness.   Psychiatric:         Mood and Affect: Mood normal.         Behavior: Behavior normal.         Thought Content: Thought content normal.         Judgment: Judgment normal.         Assessment/Plan   Problem List Items Addressed This Visit       Current smoker    Mild episode of recurrent major depressive disorder (CMS/HCC)    Fibromyalgia    Primary  osteoarthritis involving multiple joints - Primary     Other Visit Diagnoses       Menopause        Relevant Medications    conj estrog-medroxyprogest ace (Prempro) 0.3-1.5 mg tablet    Other Relevant Orders    Follow Up In [facility] - PCP            Patient education provided.  Stay current with age appropriate health maintenance as instructed.  Appointment here or ER with new or worsening symptoms'  Keep appropriate follow-up visit.  Stay current with proper immunizations   Further workup with persistent or worsening symptoms  Report suicidal ideation report psychotic or manic symptoms  90-day recheck  Discussed risks of hormonal use

## 2024-03-19 ENCOUNTER — OFFICE VISIT (OUTPATIENT)
Dept: PRIMARY CARE | Facility: CLINIC | Age: 54
End: 2024-03-19
Payer: COMMERCIAL

## 2024-03-19 VITALS
WEIGHT: 165 LBS | DIASTOLIC BLOOD PRESSURE: 89 MMHG | HEIGHT: 67 IN | OXYGEN SATURATION: 98 % | HEART RATE: 108 BPM | SYSTOLIC BLOOD PRESSURE: 126 MMHG | BODY MASS INDEX: 25.9 KG/M2

## 2024-03-19 DIAGNOSIS — Z78.0 MENOPAUSE: ICD-10-CM

## 2024-03-19 DIAGNOSIS — M15.9 PRIMARY OSTEOARTHRITIS INVOLVING MULTIPLE JOINTS: Primary | ICD-10-CM

## 2024-03-19 DIAGNOSIS — F17.200 CURRENT SMOKER: ICD-10-CM

## 2024-03-19 DIAGNOSIS — F33.0 MILD EPISODE OF RECURRENT MAJOR DEPRESSIVE DISORDER (CMS-HCC): ICD-10-CM

## 2024-03-19 DIAGNOSIS — M79.7 FIBROMYALGIA: ICD-10-CM

## 2024-03-19 PROCEDURE — 99214 OFFICE O/P EST MOD 30 MIN: CPT | Performed by: FAMILY MEDICINE

## 2024-03-19 PROCEDURE — 3074F SYST BP LT 130 MM HG: CPT | Performed by: FAMILY MEDICINE

## 2024-03-19 PROCEDURE — 3079F DIAST BP 80-89 MM HG: CPT | Performed by: FAMILY MEDICINE

## 2024-03-19 RX ORDER — CONJUGATED ESTROGENS AND MEDROXYPROGESTERONE ACETATE .3; 1.5 MG/1; MG/1
1 TABLET, SUGAR COATED ORAL DAILY
Qty: 30 TABLET | Refills: 3 | Status: SHIPPED | OUTPATIENT
Start: 2024-03-19 | End: 2025-03-19

## 2024-03-19 ASSESSMENT — ENCOUNTER SYMPTOMS
NECK PAIN: 0
SHORTNESS OF BREATH: 0
NERVOUS/ANXIOUS: 0
DIARRHEA: 0
SORE THROAT: 0
DIZZINESS: 0
CONSTIPATION: 0
MYALGIAS: 0
BRUISES/BLEEDS EASILY: 0
BACK PAIN: 0
EYE DISCHARGE: 0
DYSPHORIC MOOD: 0
NUMBNESS: 0
ACTIVITY CHANGE: 0
HEADACHES: 0
ABDOMINAL PAIN: 0
WEAKNESS: 0
ADENOPATHY: 0
CHEST TIGHTNESS: 0
BLOOD IN STOOL: 0
ARTHRALGIAS: 0
FATIGUE: 0
COUGH: 0
VOMITING: 0
HEMATURIA: 0
DIFFICULTY URINATING: 0
NAUSEA: 0

## 2024-04-05 DIAGNOSIS — M79.7 FIBROMYALGIA: ICD-10-CM

## 2024-04-05 DIAGNOSIS — M06.9 RHEUMATOID ARTHRITIS FLARE (MULTI): ICD-10-CM

## 2024-04-05 RX ORDER — CYCLOBENZAPRINE HCL 10 MG
10 TABLET ORAL EVERY 8 HOURS
Qty: 90 TABLET | Refills: 0 | Status: SHIPPED | OUTPATIENT
Start: 2024-04-05 | End: 2024-05-07

## 2024-04-05 RX ORDER — GABAPENTIN 100 MG/1
100 CAPSULE ORAL 3 TIMES DAILY
Qty: 90 CAPSULE | Refills: 1 | Status: SHIPPED | OUTPATIENT
Start: 2024-04-05 | End: 2024-04-09

## 2024-04-05 NOTE — TELEPHONE ENCOUNTER
Rx Refill Request     Name: Stella Dunn  :  1970   Medication Name:  gabapentin (Neurontin) 100 mg capsule   Specific Pharmacy location:  [facility]/pharmacy #[id number] [place] [address]   Date of last appointment:  3/19/2024   Date of next appointment:  2024   Best number to reach patient:  [phone]

## 2024-04-05 NOTE — TELEPHONE ENCOUNTER
Rx Refill Request     Name: Stella Dunn  :  1970     Date of last appointment:  2024  Date of next appointment:  2024  Best number to reach patient:  [phone]

## 2024-04-09 RX ORDER — GABAPENTIN 100 MG/1
CAPSULE ORAL
Qty: 90 CAPSULE | Refills: 1 | Status: SHIPPED | OUTPATIENT
Start: 2024-04-09 | End: 2024-06-06

## 2024-04-22 DIAGNOSIS — F32.A ANXIETY AND DEPRESSION: ICD-10-CM

## 2024-04-22 DIAGNOSIS — I10 PRIMARY HYPERTENSION: ICD-10-CM

## 2024-04-22 DIAGNOSIS — F41.9 ANXIETY AND DEPRESSION: ICD-10-CM

## 2024-04-22 RX ORDER — VENLAFAXINE HYDROCHLORIDE 75 MG/1
CAPSULE, EXTENDED RELEASE ORAL
Qty: 90 CAPSULE | Refills: 3 | Status: SHIPPED | OUTPATIENT
Start: 2024-04-22

## 2024-04-22 RX ORDER — LISINOPRIL 10 MG/1
10 TABLET ORAL DAILY
Qty: 90 TABLET | Refills: 3 | Status: SHIPPED | OUTPATIENT
Start: 2024-04-22

## 2024-04-22 NOTE — TELEPHONE ENCOUNTER
Rx Refill Request     Name: Stella CORBY Dunn  :  1970   Medication Name:  venlafaxine XR (Effexor-XR) 75 mg 24 hr capsule  lisinopril 10 mg tablet    Specific Pharmacy location:  [facility]/pharmacy #[id number] [place], [place]   Date of last appointment:  3/19/2024   Date of next appointment:  2024   Best number to reach patient:  [phone]

## 2024-05-01 NOTE — PROGRESS NOTES
"Subjective   Patient ID: Stella Dunn is a 53 y.o. female who presents for Hypertension and Fatigue.  HPI    Has been feeling more tired over the last few days even when getting sleep  Positive fatigue  Recommend blood work    Positive fibromyalgia no progression or worsening    Rheumatoid arthritis stable keep follow-up with rheumatologist    Due for mammogram    Mood stable  No suicidal ideation no psychotic or manic symptoms    High cholesterol stable watch diet follow blood work    Hypertension stable no chest pain or shortness of breath    Low vitamin D recommend replace follow blood work     patient smokes and should quit    Review of Systems   Constitutional:  Negative for activity change and fatigue.   HENT:  Negative for congestion and sore throat.    Eyes:  Negative for discharge.   Respiratory:  Negative for cough, chest tightness and shortness of breath.    Cardiovascular:  Negative for chest pain and leg swelling.   Gastrointestinal:  Negative for abdominal pain, blood in stool, constipation, diarrhea, nausea and vomiting.   Endocrine: Negative for cold intolerance and heat intolerance.   Genitourinary:  Negative for difficulty urinating and hematuria.   Musculoskeletal:  Negative for arthralgias, back pain, gait problem, myalgias and neck pain.   Allergic/Immunologic: Negative for environmental allergies.   Neurological:  Negative for dizziness, syncope, weakness, numbness and headaches.   Hematological:  Negative for adenopathy. Does not bruise/bleed easily.   Psychiatric/Behavioral:  Negative for dysphoric mood. The patient is not nervous/anxious.    All other systems reviewed and are negative.      Objective   /83 (BP Location: Left arm, BP Cuff Size: Large adult)   Pulse 97   Ht 1.702 m (5' 7\")   Wt 74.8 kg (164 lb 12.8 oz)   SpO2 98%   BMI 25.81 kg/m²    Physical Exam  Vitals and nursing note reviewed.   Constitutional:       General: She is not in acute distress.     Appearance: " Normal appearance.   HENT:      Head: Normocephalic and atraumatic.      Right Ear: Tympanic membrane, ear canal and external ear normal.      Left Ear: Tympanic membrane, ear canal and external ear normal.      Nose: Nose normal.      Mouth/Throat:      Mouth: Mucous membranes are moist.      Pharynx: Oropharynx is clear. No oropharyngeal exudate or posterior oropharyngeal erythema.   Eyes:      Extraocular Movements: Extraocular movements intact.      Conjunctiva/sclera: Conjunctivae normal.      Pupils: Pupils are equal, round, and reactive to light.   Cardiovascular:      Rate and Rhythm: Normal rate and regular rhythm.      Pulses: Normal pulses.      Heart sounds: Normal heart sounds. No murmur heard.  Pulmonary:      Effort: Pulmonary effort is normal. No respiratory distress.      Breath sounds: Normal breath sounds. No wheezing or rales.   Abdominal:      General: Abdomen is flat. Bowel sounds are normal. There is no distension.      Palpations: Abdomen is soft. There is no mass.      Tenderness: There is no abdominal tenderness.   Musculoskeletal:         General: No swelling or deformity. Normal range of motion.      Cervical back: Normal range of motion and neck supple.      Right lower leg: No edema.      Left lower leg: No edema.   Lymphadenopathy:      Cervical: No cervical adenopathy.   Skin:     General: Skin is warm and dry.      Capillary Refill: Capillary refill takes less than 2 seconds.      Findings: No lesion or rash.   Neurological:      General: No focal deficit present.      Mental Status: She is alert and oriented to person, place, and time.      Cranial Nerves: No cranial nerve deficit.      Motor: No weakness.   Psychiatric:         Mood and Affect: Mood normal.         Behavior: Behavior normal.         Thought Content: Thought content normal.         Judgment: Judgment normal.         Assessment/Plan   Problem List Items Addressed This Visit       Current smoker    Fatigue    Relevant  Orders    CBC and Auto Differential    Comprehensive Metabolic Panel    TSH with reflex to Free T4 if abnormal    Vitamin B12    Vitamin D 25-Hydroxy,Total (for eval of Vitamin D levels)    Follow Up In [facility] - PCP    Primary hypertension    Mild episode of recurrent major depressive disorder (CMS-HCC)    Fibromyalgia - Primary    Primary osteoarthritis involving multiple joints    Rheumatoid arthritis flare (Multi)    Vitamin D deficiency    Relevant Orders    Vitamin D 25-Hydroxy,Total (for eval of Vitamin D levels)     Other Visit Diagnoses       Breast cancer screening by mammogram        Relevant Orders    BI mammo bilateral screening tomosynthesis            Patient education provided.  Stay current with age appropriate health maintenance as instructed.  Appointment here or ER with new or worsening symptoms'  Keep appropriate follow-up visit.  Stay current with proper immunizations   Testing as above  Recheck 3 months and as needed  Return sooner with new or worsening symptoms  Discussed at length with patient

## 2024-05-06 ENCOUNTER — LAB (OUTPATIENT)
Dept: LAB | Facility: LAB | Age: 54
End: 2024-05-06
Payer: COMMERCIAL

## 2024-05-06 ENCOUNTER — OFFICE VISIT (OUTPATIENT)
Dept: PRIMARY CARE | Facility: CLINIC | Age: 54
End: 2024-05-06
Payer: COMMERCIAL

## 2024-05-06 VITALS
HEART RATE: 97 BPM | OXYGEN SATURATION: 98 % | SYSTOLIC BLOOD PRESSURE: 122 MMHG | HEIGHT: 67 IN | WEIGHT: 164.8 LBS | BODY MASS INDEX: 25.87 KG/M2 | DIASTOLIC BLOOD PRESSURE: 83 MMHG

## 2024-05-06 DIAGNOSIS — I10 PRIMARY HYPERTENSION: ICD-10-CM

## 2024-05-06 DIAGNOSIS — M15.9 PRIMARY OSTEOARTHRITIS INVOLVING MULTIPLE JOINTS: ICD-10-CM

## 2024-05-06 DIAGNOSIS — M79.7 FIBROMYALGIA: Primary | ICD-10-CM

## 2024-05-06 DIAGNOSIS — F33.0 MILD EPISODE OF RECURRENT MAJOR DEPRESSIVE DISORDER (CMS-HCC): ICD-10-CM

## 2024-05-06 DIAGNOSIS — R53.82 CHRONIC FATIGUE: ICD-10-CM

## 2024-05-06 DIAGNOSIS — Z12.31 BREAST CANCER SCREENING BY MAMMOGRAM: ICD-10-CM

## 2024-05-06 DIAGNOSIS — E55.9 VITAMIN D DEFICIENCY: ICD-10-CM

## 2024-05-06 DIAGNOSIS — M06.9 RHEUMATOID ARTHRITIS FLARE (MULTI): ICD-10-CM

## 2024-05-06 DIAGNOSIS — F17.200 CURRENT SMOKER: ICD-10-CM

## 2024-05-06 LAB
25(OH)D3 SERPL-MCNC: 33 NG/ML (ref 30–100)
ALBUMIN SERPL BCP-MCNC: 4.3 G/DL (ref 3.4–5)
ALP SERPL-CCNC: 55 U/L (ref 33–110)
ALT SERPL W P-5'-P-CCNC: 22 U/L (ref 7–45)
ANION GAP SERPL CALC-SCNC: 11 MMOL/L (ref 10–20)
AST SERPL W P-5'-P-CCNC: 17 U/L (ref 9–39)
BASOPHILS # BLD AUTO: 0.06 X10*3/UL (ref 0–0.1)
BASOPHILS NFR BLD AUTO: 0.7 %
BILIRUB SERPL-MCNC: 0.2 MG/DL (ref 0–1.2)
BUN SERPL-MCNC: 12 MG/DL (ref 6–23)
CALCIUM SERPL-MCNC: 9.8 MG/DL (ref 8.6–10.3)
CHLORIDE SERPL-SCNC: 104 MMOL/L (ref 98–107)
CO2 SERPL-SCNC: 30 MMOL/L (ref 21–32)
CREAT SERPL-MCNC: 0.92 MG/DL (ref 0.5–1.05)
EGFRCR SERPLBLD CKD-EPI 2021: 75 ML/MIN/1.73M*2
EOSINOPHIL # BLD AUTO: 0.46 X10*3/UL (ref 0–0.7)
EOSINOPHIL NFR BLD AUTO: 5.2 %
ERYTHROCYTE [DISTWIDTH] IN BLOOD BY AUTOMATED COUNT: 13.4 % (ref 11.5–14.5)
GLUCOSE SERPL-MCNC: 99 MG/DL (ref 74–99)
HCT VFR BLD AUTO: 42.4 % (ref 36–46)
HGB BLD-MCNC: 14.1 G/DL (ref 12–16)
IMM GRANULOCYTES # BLD AUTO: 0.02 X10*3/UL (ref 0–0.7)
IMM GRANULOCYTES NFR BLD AUTO: 0.2 % (ref 0–0.9)
LYMPHOCYTES # BLD AUTO: 3.13 X10*3/UL (ref 1.2–4.8)
LYMPHOCYTES NFR BLD AUTO: 35.2 %
MCH RBC QN AUTO: 30.5 PG (ref 26–34)
MCHC RBC AUTO-ENTMCNC: 33.3 G/DL (ref 32–36)
MCV RBC AUTO: 92 FL (ref 80–100)
MONOCYTES # BLD AUTO: 0.55 X10*3/UL (ref 0.1–1)
MONOCYTES NFR BLD AUTO: 6.2 %
NEUTROPHILS # BLD AUTO: 4.68 X10*3/UL (ref 1.2–7.7)
NEUTROPHILS NFR BLD AUTO: 52.5 %
NRBC BLD-RTO: 0 /100 WBCS (ref 0–0)
PLATELET # BLD AUTO: 392 X10*3/UL (ref 150–450)
POTASSIUM SERPL-SCNC: 4.5 MMOL/L (ref 3.5–5.3)
PROT SERPL-MCNC: 6.8 G/DL (ref 6.4–8.2)
RBC # BLD AUTO: 4.62 X10*6/UL (ref 4–5.2)
SODIUM SERPL-SCNC: 140 MMOL/L (ref 136–145)
TSH SERPL-ACNC: 0.88 MIU/L (ref 0.44–3.98)
VIT B12 SERPL-MCNC: 846 PG/ML (ref 211–911)
WBC # BLD AUTO: 8.9 X10*3/UL (ref 4.4–11.3)

## 2024-05-06 PROCEDURE — 82607 VITAMIN B-12: CPT

## 2024-05-06 PROCEDURE — 85025 COMPLETE CBC W/AUTO DIFF WBC: CPT

## 2024-05-06 PROCEDURE — 84443 ASSAY THYROID STIM HORMONE: CPT

## 2024-05-06 PROCEDURE — 82306 VITAMIN D 25 HYDROXY: CPT

## 2024-05-06 PROCEDURE — 3074F SYST BP LT 130 MM HG: CPT | Performed by: FAMILY MEDICINE

## 2024-05-06 PROCEDURE — 3079F DIAST BP 80-89 MM HG: CPT | Performed by: FAMILY MEDICINE

## 2024-05-06 PROCEDURE — 80053 COMPREHEN METABOLIC PANEL: CPT

## 2024-05-06 PROCEDURE — 36415 COLL VENOUS BLD VENIPUNCTURE: CPT

## 2024-05-06 PROCEDURE — 99214 OFFICE O/P EST MOD 30 MIN: CPT | Performed by: FAMILY MEDICINE

## 2024-05-06 ASSESSMENT — ENCOUNTER SYMPTOMS
HEADACHES: 0
MYALGIAS: 0
FATIGUE: 0
DYSPHORIC MOOD: 0
NECK PAIN: 0
BACK PAIN: 0
COUGH: 0
BRUISES/BLEEDS EASILY: 0
HEMATURIA: 0
CHEST TIGHTNESS: 0
ABDOMINAL PAIN: 0
NAUSEA: 0
ARTHRALGIAS: 0
VOMITING: 0
DIFFICULTY URINATING: 0
ADENOPATHY: 0
SORE THROAT: 0
NERVOUS/ANXIOUS: 0
WEAKNESS: 0
BLOOD IN STOOL: 0
SHORTNESS OF BREATH: 0
DIZZINESS: 0
NUMBNESS: 0
CONSTIPATION: 0
ACTIVITY CHANGE: 0
DIARRHEA: 0
EYE DISCHARGE: 0

## 2024-05-07 DIAGNOSIS — M06.9 RHEUMATOID ARTHRITIS FLARE (MULTI): ICD-10-CM

## 2024-05-07 RX ORDER — CYCLOBENZAPRINE HCL 10 MG
10 TABLET ORAL EVERY 8 HOURS
Qty: 90 TABLET | Refills: 0 | Status: SHIPPED | OUTPATIENT
Start: 2024-05-07 | End: 2024-06-04

## 2024-05-07 NOTE — TELEPHONE ENCOUNTER
Rx Refill Request     Name: Stella Dunn  :  1970   Medication Name:  cyclobenzaprine (Flexeril) 10 mg tablet   Specific Pharmacy location:  [facility]/pharmacy #[id number] [place], [place]   Date of last appointment:  2024   Date of next appointment:  2024   Best number to reach patient:  [phone]

## 2024-06-04 DIAGNOSIS — M06.9 RHEUMATOID ARTHRITIS FLARE (MULTI): ICD-10-CM

## 2024-06-04 DIAGNOSIS — M79.7 FIBROMYALGIA: ICD-10-CM

## 2024-06-04 RX ORDER — CYCLOBENZAPRINE HCL 10 MG
10 TABLET ORAL EVERY 8 HOURS
Qty: 90 TABLET | Refills: 0 | Status: SHIPPED | OUTPATIENT
Start: 2024-06-04

## 2024-06-04 NOTE — TELEPHONE ENCOUNTER
Rx Refill Request     Name: Stella Dunn  :  1970   Medication Name:      cyclobenzaprine (Flexeril) 10 mg tablet     Specific Pharmacy location:  [facility]/pharmacy #[id number] [place], [place]   Date of last appointment:  2024   Date of next appointment:  2024   Best number to reach patient:  [phone]

## 2024-06-06 RX ORDER — GABAPENTIN 100 MG/1
100 CAPSULE ORAL 3 TIMES DAILY
Qty: 90 CAPSULE | Refills: 0 | Status: SHIPPED | OUTPATIENT
Start: 2024-06-06

## 2024-06-20 ENCOUNTER — APPOINTMENT (OUTPATIENT)
Dept: PRIMARY CARE | Facility: CLINIC | Age: 54
End: 2024-06-20
Payer: COMMERCIAL

## 2024-07-16 DIAGNOSIS — M06.9 RHEUMATOID ARTHRITIS FLARE (MULTI): ICD-10-CM

## 2024-07-16 DIAGNOSIS — M79.7 FIBROMYALGIA: ICD-10-CM

## 2024-07-17 RX ORDER — GABAPENTIN 100 MG/1
100 CAPSULE ORAL 3 TIMES DAILY
Qty: 90 CAPSULE | Refills: 0 | Status: SHIPPED | OUTPATIENT
Start: 2024-07-17

## 2024-07-17 RX ORDER — CYCLOBENZAPRINE HCL 10 MG
10 TABLET ORAL EVERY 8 HOURS
Qty: 90 TABLET | Refills: 0 | Status: SHIPPED | OUTPATIENT
Start: 2024-07-17

## 2024-07-17 NOTE — TELEPHONE ENCOUNTER
Rx Refill Request     Name: Stella Dunn  :  1970   Medication Name:  cyclobenzaprine (Flexeril) 10 mg tablet   Specific Pharmacy location:  [facility]/pharmacy #[id number] [place], [place]   Date of last appointment:  2024   Date of next appointment:  2024   Best number to reach patient:  [phone]

## 2024-07-17 NOTE — TELEPHONE ENCOUNTER
Rx Refill Request     Name: Stella CORBY Dunn  :  1970   Medication Name:  gabapentin (Neurontin) 100 mg capsule   Specific Pharmacy location:  [facility]/pharmacy #[address] [place] [address]   Date of last appointment:  Visit date not found   Date of next appointment:  Visit date not found   Best number to reach patient:  [phone]

## 2024-08-06 ENCOUNTER — APPOINTMENT (OUTPATIENT)
Dept: PRIMARY CARE | Facility: CLINIC | Age: 54
End: 2024-08-06
Payer: COMMERCIAL

## 2025-08-06 DIAGNOSIS — Z12.11 SCREENING FOR COLORECTAL CANCER: ICD-10-CM

## 2025-08-06 DIAGNOSIS — Z12.12 SCREENING FOR COLORECTAL CANCER: ICD-10-CM

## 2025-09-23 ENCOUNTER — APPOINTMENT (OUTPATIENT)
Dept: PRIMARY CARE | Facility: CLINIC | Age: 55
End: 2025-09-23